# Patient Record
Sex: MALE | Race: ASIAN | Employment: OTHER
[De-identification: names, ages, dates, MRNs, and addresses within clinical notes are randomized per-mention and may not be internally consistent; named-entity substitution may affect disease eponyms.]

---

## 2017-04-24 ENCOUNTER — SURGERY (OUTPATIENT)
Age: 72
End: 2017-04-24

## 2017-04-24 ENCOUNTER — HOSPITAL ENCOUNTER (OUTPATIENT)
Facility: CLINIC | Age: 72
Discharge: HOME | End: 2017-04-24
Attending: COLON & RECTAL SURGERY | Admitting: COLON & RECTAL SURGERY
Payer: MEDICARE

## 2017-04-24 VITALS
DIASTOLIC BLOOD PRESSURE: 90 MMHG | HEIGHT: 62 IN | WEIGHT: 120 LBS | BODY MASS INDEX: 22.08 KG/M2 | SYSTOLIC BLOOD PRESSURE: 134 MMHG | RESPIRATION RATE: 17 BRPM | OXYGEN SATURATION: 97 %

## 2017-04-24 LAB — COLONOSCOPY: NORMAL

## 2017-04-24 PROCEDURE — 88305 TISSUE EXAM BY PATHOLOGIST: CPT | Performed by: COLON & RECTAL SURGERY

## 2017-04-24 PROCEDURE — 88342 IMHCHEM/IMCYTCHM 1ST ANTB: CPT | Performed by: COLON & RECTAL SURGERY

## 2017-04-24 PROCEDURE — 45380 COLONOSCOPY AND BIOPSY: CPT | Performed by: COLON & RECTAL SURGERY

## 2017-04-24 PROCEDURE — 88305 TISSUE EXAM BY PATHOLOGIST: CPT | Mod: 26 | Performed by: COLON & RECTAL SURGERY

## 2017-04-24 PROCEDURE — 88341 IMHCHEM/IMCYTCHM EA ADD ANTB: CPT | Mod: 26 | Performed by: COLON & RECTAL SURGERY

## 2017-04-24 PROCEDURE — G0500 MOD SEDAT ENDO SERVICE >5YRS: HCPCS | Performed by: COLON & RECTAL SURGERY

## 2017-04-24 PROCEDURE — 25000125 ZZHC RX 250: Performed by: COLON & RECTAL SURGERY

## 2017-04-24 PROCEDURE — 88341 IMHCHEM/IMCYTCHM EA ADD ANTB: CPT | Performed by: COLON & RECTAL SURGERY

## 2017-04-24 PROCEDURE — 25000128 H RX IP 250 OP 636: Performed by: COLON & RECTAL SURGERY

## 2017-04-24 PROCEDURE — 88342 IMHCHEM/IMCYTCHM 1ST ANTB: CPT | Mod: 26 | Performed by: COLON & RECTAL SURGERY

## 2017-04-24 RX ORDER — FENTANYL CITRATE 50 UG/ML
INJECTION, SOLUTION INTRAMUSCULAR; INTRAVENOUS PRN
Status: DISCONTINUED | OUTPATIENT
Start: 2017-04-24 | End: 2017-04-24 | Stop reason: HOSPADM

## 2017-04-24 RX ORDER — LIDOCAINE 40 MG/G
CREAM TOPICAL
Status: DISCONTINUED | OUTPATIENT
Start: 2017-04-24 | End: 2017-04-24 | Stop reason: HOSPADM

## 2017-04-24 RX ORDER — ONDANSETRON 2 MG/ML
4 INJECTION INTRAMUSCULAR; INTRAVENOUS
Status: DISCONTINUED | OUTPATIENT
Start: 2017-04-24 | End: 2017-04-24 | Stop reason: HOSPADM

## 2017-04-24 RX ADMIN — FENTANYL CITRATE 50 MCG: 50 INJECTION, SOLUTION INTRAMUSCULAR; INTRAVENOUS at 10:19

## 2017-04-24 RX ADMIN — FENTANYL CITRATE 50 MCG: 50 INJECTION, SOLUTION INTRAMUSCULAR; INTRAVENOUS at 10:26

## 2017-04-24 RX ADMIN — MIDAZOLAM HYDROCHLORIDE 1 MG: 1 INJECTION, SOLUTION INTRAMUSCULAR; INTRAVENOUS at 10:26

## 2017-04-24 RX ADMIN — MIDAZOLAM HYDROCHLORIDE 1 MG: 1 INJECTION, SOLUTION INTRAMUSCULAR; INTRAVENOUS at 10:19

## 2017-04-24 NOTE — H&P
Colon & Rectal Surgery History and Physical  Pre-Endoscopy Procedure Note    History of Present Illness   I have been asked by the physicians of the [facility] to evaluate this 71 year old male for colorectal cancer screening. S/he denies any abdominal pain, weight loss, bleeding per rectum, or recent change in bowel habits.    Past Medical History  Diagnosis Date     Heart disease    Pre-Diabetes  Essential Hypertension    Past Surgical History   No pertinent surgical history.     Medications  Medication Sig     amLODIPine (NORVASC) 5 MG tablet Take 1 tablet (5 mg) by mouth daily     lisinopril (PRINIVIL,ZESTRIL) 10 MG tablet Take 1 tablet (10 mg) by mouth daily       Allergies   No Known Allergies     Family History   Family history is not contributory     Social History   He reports that he has never smoked. He does not have any smokeless tobacco history on file. He reports that he does not drink alcohol or use illicit drugs.    Review of Systems   Constitutional:  No fever, weight change or fatigue.    Eyes:     No dry eyes or vision changes.   Ears/Nose/Throat/Neck:  No oral ulcers, sore throat or voice change.    Cardiovascular:   No palpitations, syncope, angina or edema.   Respiratory:    No chest pain, excessive sleepiness, shortness of breath or hemoptysis.    Gastrointestinal:   No abdominal pain, nausea, vomiting, diarrhea or heartburn.    Genitourinary:   No dysuria, hematuria, urinary retention or urinary frequency.   Musculoskeletal:  No joint swelling or arthralgias.    Dermatologic:  No skin rash or other skin changes.   Neurologic:    No focal weakness or numbness. No neuropathy.   Psychiatric:    No depression, anxiety, suicidal ideation, or paranoid ideation.   Endocrine:   No cold or heat intolerance, polydipsia, hirsutism, change in libido, or flushing.   Hematology/Lymphatic:  No bleeding or lymphadenopathy.    Allergy/Immunology:  No rhinitis or hives.     Physical Exam   Vitals:  BP  "115/79, RR 12, HR 62, height 1.575 m (5' 2\"), weight 54.4 kg (120 lb), SpO2 100 %.    General:  Alert and oriented to person, place and time   Airway: Normal oropharyngeal airway and neck mobility   Lungs:  Clear bilaterally   Heart:  Regular rate and rhythm   Abdomen: Soft, NT, ND, no masses   Rectal:  Perianal skin without excoriation, hemorrhoidal disease or anal fissure        Digital rectal examination reveals normal sphincter tone without masses    ASA Grade: II (mild systemic disease)    Impression: Cleared for use of conscious sedation for colorectal cancer screening    Plan: Proceed with colonoscopy     Estrella Epstein MD  [facility]  [phone]  "

## 2017-04-26 ENCOUNTER — TELEPHONE (OUTPATIENT)
Dept: FAMILY MEDICINE | Facility: CLINIC | Age: 72
End: 2017-04-26

## 2017-04-26 LAB — COPATH REPORT: NORMAL

## 2017-04-26 NOTE — TELEPHONE ENCOUNTER
Reason for Call:  Other appointment    Detailed comments: The patient was just diagnosed with Colon Cancer  And they would like for him to be seen sooner then 5/26 which is the first available opening    Phone Number Patient can be reached at: Wife Marie [phone]  Daughter Sugar [phone]    Best Time: anytime    Can we leave a detailed message on this number? YES    Call taken on 4/26/2017 at 12:14 PM by Ranjana Leblanc

## 2017-05-01 ENCOUNTER — HOSPITAL ENCOUNTER (OUTPATIENT)
Dept: CT IMAGING | Facility: CLINIC | Age: 72
Discharge: HOME | End: 2017-05-01
Attending: COLON & RECTAL SURGERY | Admitting: COLON & RECTAL SURGERY
Payer: MEDICARE

## 2017-05-01 DIAGNOSIS — C18.9 COLON CANCER (H): ICD-10-CM

## 2017-05-01 PROCEDURE — 25500064 ZZH RX 255 OP 636: Performed by: COLON & RECTAL SURGERY

## 2017-05-01 PROCEDURE — 25000125 ZZHC RX 250: Performed by: COLON & RECTAL SURGERY

## 2017-05-01 PROCEDURE — 71260 CT THORAX DX C+: CPT

## 2017-05-01 PROCEDURE — 74177 CT ABD & PELVIS W/CONTRAST: CPT

## 2017-05-01 RX ORDER — IOPAMIDOL 755 MG/ML
60 INJECTION, SOLUTION INTRAVASCULAR ONCE
Status: COMPLETED | OUTPATIENT
Start: 2017-05-01 | End: 2017-05-01

## 2017-05-01 RX ADMIN — IOPAMIDOL 60 ML: 755 INJECTION, SOLUTION INTRAVENOUS at 07:36

## 2017-05-01 RX ADMIN — SODIUM CHLORIDE 60 ML: 9 INJECTION, SOLUTION INTRAVENOUS at 07:36

## 2017-05-22 ENCOUNTER — TRANSFERRED RECORDS (OUTPATIENT)
Dept: HEALTH INFORMATION MANAGEMENT | Facility: CLINIC | Age: 72
End: 2017-05-22

## 2017-05-30 RX ORDER — AMLODIPINE AND BENAZEPRIL HYDROCHLORIDE 10; 20 MG/1; MG/1
1 CAPSULE ORAL DAILY
COMMUNITY
End: 2018-07-13

## 2017-05-31 ENCOUNTER — ANESTHESIA EVENT (OUTPATIENT)
Dept: SURGERY | Facility: CLINIC | Age: 72
DRG: 330 | End: 2017-05-31
Payer: MEDICARE

## 2017-06-01 ENCOUNTER — ANESTHESIA (OUTPATIENT)
Dept: SURGERY | Facility: CLINIC | Age: 72
DRG: 330 | End: 2017-06-01
Payer: MEDICARE

## 2017-06-01 ENCOUNTER — HOSPITAL ENCOUNTER (INPATIENT)
Facility: CLINIC | Age: 72
LOS: 4 days | Discharge: HOME | DRG: 330 | End: 2017-06-05
Attending: COLON & RECTAL SURGERY | Admitting: COLON & RECTAL SURGERY
Payer: MEDICARE

## 2017-06-01 DIAGNOSIS — C18.7 MALIGNANT NEOPLASM OF SIGMOID COLON (H): Primary | ICD-10-CM

## 2017-06-01 PROBLEM — C18.9 COLON CANCER (H): Status: ACTIVE | Noted: 2017-06-01

## 2017-06-01 LAB
CEA SERPL-MCNC: 3.2 UG/L (ref 0–2.5)
CREAT SERPL-MCNC: 0.87 MG/DL (ref 0.66–1.25)
GFR SERPL CREATININE-BSD FRML MDRD: 87 ML/MIN/1.7M2
GLUCOSE SERPL-MCNC: 113 MG/DL (ref 70–99)
PLATELET # BLD AUTO: 213 10E9/L (ref 150–450)
POTASSIUM SERPL-SCNC: 4.1 MMOL/L (ref 3.4–5.3)

## 2017-06-01 PROCEDURE — 25000125 ZZHC RX 250: Performed by: COLON & RECTAL SURGERY

## 2017-06-01 PROCEDURE — 25000125 ZZHC RX 250: Performed by: ANESTHESIOLOGY

## 2017-06-01 PROCEDURE — 37000009 ZZH ANESTHESIA TECHNICAL FEE, EACH ADDTL 15 MIN: Performed by: COLON & RECTAL SURGERY

## 2017-06-01 PROCEDURE — 27210794 ZZH OR GENERAL SUPPLY STERILE: Performed by: COLON & RECTAL SURGERY

## 2017-06-01 PROCEDURE — 0DTG4ZZ RESECTION OF LEFT LARGE INTESTINE, PERCUTANEOUS ENDOSCOPIC APPROACH: ICD-10-PCS | Performed by: COLON & RECTAL SURGERY

## 2017-06-01 PROCEDURE — 25000128 H RX IP 250 OP 636: Performed by: NURSE ANESTHETIST, CERTIFIED REGISTERED

## 2017-06-01 PROCEDURE — 37000008 ZZH ANESTHESIA TECHNICAL FEE, 1ST 30 MIN: Performed by: COLON & RECTAL SURGERY

## 2017-06-01 PROCEDURE — A9270 NON-COVERED ITEM OR SERVICE: HCPCS | Mod: GY | Performed by: COLON & RECTAL SURGERY

## 2017-06-01 PROCEDURE — 25800025 ZZH RX 258: Performed by: COLON & RECTAL SURGERY

## 2017-06-01 PROCEDURE — 27210995 ZZH RX 272: Performed by: COLON & RECTAL SURGERY

## 2017-06-01 PROCEDURE — 25000128 H RX IP 250 OP 636: Performed by: COLON & RECTAL SURGERY

## 2017-06-01 PROCEDURE — 71000013 ZZH RECOVERY PHASE 1 LEVEL 1 EA ADDTL HR: Performed by: COLON & RECTAL SURGERY

## 2017-06-01 PROCEDURE — 82378 CARCINOEMBRYONIC ANTIGEN: CPT | Performed by: COLON & RECTAL SURGERY

## 2017-06-01 PROCEDURE — 40000170 ZZH STATISTIC PRE-PROCEDURE ASSESSMENT II: Performed by: COLON & RECTAL SURGERY

## 2017-06-01 PROCEDURE — 36000067 ZZH SURGERY LEVEL 5 1ST 30 MIN: Performed by: COLON & RECTAL SURGERY

## 2017-06-01 PROCEDURE — 25000132 ZZH RX MED GY IP 250 OP 250 PS 637: Mod: GY | Performed by: COLON & RECTAL SURGERY

## 2017-06-01 PROCEDURE — 25000125 ZZHC RX 250: Performed by: NURSE ANESTHETIST, CERTIFIED REGISTERED

## 2017-06-01 PROCEDURE — 27211024 ZZHC OR SUPPLY OTHER OPNP: Performed by: COLON & RECTAL SURGERY

## 2017-06-01 PROCEDURE — 85049 AUTOMATED PLATELET COUNT: CPT | Performed by: COLON & RECTAL SURGERY

## 2017-06-01 PROCEDURE — 82947 ASSAY GLUCOSE BLOOD QUANT: CPT | Performed by: COLON & RECTAL SURGERY

## 2017-06-01 PROCEDURE — 88309 TISSUE EXAM BY PATHOLOGIST: CPT | Mod: 26 | Performed by: COLON & RECTAL SURGERY

## 2017-06-01 PROCEDURE — 36000069 ZZH SURGERY LEVEL 5 EA 15 ADDTL MIN: Performed by: COLON & RECTAL SURGERY

## 2017-06-01 PROCEDURE — 71000012 ZZH RECOVERY PHASE 1 LEVEL 1 FIRST HR: Performed by: COLON & RECTAL SURGERY

## 2017-06-01 PROCEDURE — 84132 ASSAY OF SERUM POTASSIUM: CPT | Performed by: COLON & RECTAL SURGERY

## 2017-06-01 PROCEDURE — 25000566 ZZH SEVOFLURANE, EA 15 MIN: Performed by: COLON & RECTAL SURGERY

## 2017-06-01 PROCEDURE — 25000128 H RX IP 250 OP 636: Performed by: ANESTHESIOLOGY

## 2017-06-01 PROCEDURE — 88309 TISSUE EXAM BY PATHOLOGIST: CPT | Performed by: COLON & RECTAL SURGERY

## 2017-06-01 PROCEDURE — 36415 COLL VENOUS BLD VENIPUNCTURE: CPT | Performed by: COLON & RECTAL SURGERY

## 2017-06-01 PROCEDURE — 12000007 ZZH R&B INTERMEDIATE

## 2017-06-01 PROCEDURE — 82565 ASSAY OF CREATININE: CPT | Performed by: COLON & RECTAL SURGERY

## 2017-06-01 PROCEDURE — 0DJD8ZZ INSPECTION OF LOWER INTESTINAL TRACT, VIA NATURAL OR ARTIFICIAL OPENING ENDOSCOPIC: ICD-10-PCS | Performed by: COLON & RECTAL SURGERY

## 2017-06-01 RX ORDER — FENTANYL CITRATE 50 UG/ML
INJECTION, SOLUTION INTRAMUSCULAR; INTRAVENOUS PRN
Status: DISCONTINUED | OUTPATIENT
Start: 2017-06-01 | End: 2017-06-01

## 2017-06-01 RX ORDER — PROPOFOL 10 MG/ML
INJECTION, EMULSION INTRAVENOUS PRN
Status: DISCONTINUED | OUTPATIENT
Start: 2017-06-01 | End: 2017-06-01

## 2017-06-01 RX ORDER — SODIUM CHLORIDE, SODIUM LACTATE, POTASSIUM CHLORIDE, CALCIUM CHLORIDE 600; 310; 30; 20 MG/100ML; MG/100ML; MG/100ML; MG/100ML
INJECTION, SOLUTION INTRAVENOUS CONTINUOUS
Status: DISCONTINUED | OUTPATIENT
Start: 2017-06-01 | End: 2017-06-01

## 2017-06-01 RX ORDER — DEXAMETHASONE SODIUM PHOSPHATE 4 MG/ML
INJECTION, SOLUTION INTRA-ARTICULAR; INTRALESIONAL; INTRAMUSCULAR; INTRAVENOUS; SOFT TISSUE PRN
Status: DISCONTINUED | OUTPATIENT
Start: 2017-06-01 | End: 2017-06-01

## 2017-06-01 RX ORDER — ALVIMOPAN 12 MG/1
12 CAPSULE ORAL ONCE
Status: COMPLETED | OUTPATIENT
Start: 2017-06-01 | End: 2017-06-01

## 2017-06-01 RX ORDER — ACETAMINOPHEN 10 MG/ML
1000 INJECTION, SOLUTION INTRAVENOUS ONCE
Status: COMPLETED | OUTPATIENT
Start: 2017-06-01 | End: 2017-06-01

## 2017-06-01 RX ORDER — ONDANSETRON 4 MG/1
4 TABLET, ORALLY DISINTEGRATING ORAL EVERY 30 MIN PRN
Status: DISCONTINUED | OUTPATIENT
Start: 2017-06-01 | End: 2017-06-01 | Stop reason: HOSPADM

## 2017-06-01 RX ORDER — NALOXONE HYDROCHLORIDE 0.4 MG/ML
.1-.4 INJECTION, SOLUTION INTRAMUSCULAR; INTRAVENOUS; SUBCUTANEOUS
Status: DISCONTINUED | OUTPATIENT
Start: 2017-06-01 | End: 2017-06-01

## 2017-06-01 RX ORDER — ALBUTEROL SULFATE 0.83 MG/ML
2.5 SOLUTION RESPIRATORY (INHALATION) EVERY 4 HOURS PRN
Status: DISCONTINUED | OUTPATIENT
Start: 2017-06-01 | End: 2017-06-01 | Stop reason: HOSPADM

## 2017-06-01 RX ORDER — DIPHENHYDRAMINE HYDROCHLORIDE 50 MG/ML
12.5 INJECTION INTRAMUSCULAR; INTRAVENOUS EVERY 6 HOURS PRN
Status: DISCONTINUED | OUTPATIENT
Start: 2017-06-01 | End: 2017-06-05 | Stop reason: HOSPADM

## 2017-06-01 RX ORDER — LISINOPRIL 10 MG/1
20 TABLET ORAL DAILY
Status: DISCONTINUED | OUTPATIENT
Start: 2017-06-02 | End: 2017-06-05 | Stop reason: HOSPADM

## 2017-06-01 RX ORDER — CIPROFLOXACIN 2 MG/ML
400 INJECTION, SOLUTION INTRAVENOUS
Status: DISCONTINUED | OUTPATIENT
Start: 2017-06-01 | End: 2017-06-01

## 2017-06-01 RX ORDER — NEOSTIGMINE METHYLSULFATE 1 MG/ML
VIAL (ML) INJECTION PRN
Status: DISCONTINUED | OUTPATIENT
Start: 2017-06-01 | End: 2017-06-01

## 2017-06-01 RX ORDER — NALOXONE HYDROCHLORIDE 0.4 MG/ML
.1-.4 INJECTION, SOLUTION INTRAMUSCULAR; INTRAVENOUS; SUBCUTANEOUS
Status: DISCONTINUED | OUTPATIENT
Start: 2017-06-01 | End: 2017-06-05 | Stop reason: HOSPADM

## 2017-06-01 RX ORDER — ONDANSETRON 2 MG/ML
4 INJECTION INTRAMUSCULAR; INTRAVENOUS EVERY 30 MIN PRN
Status: DISCONTINUED | OUTPATIENT
Start: 2017-06-01 | End: 2017-06-01 | Stop reason: HOSPADM

## 2017-06-01 RX ORDER — GLYCOPYRROLATE 0.2 MG/ML
INJECTION, SOLUTION INTRAMUSCULAR; INTRAVENOUS PRN
Status: DISCONTINUED | OUTPATIENT
Start: 2017-06-01 | End: 2017-06-01

## 2017-06-01 RX ORDER — CIPROFLOXACIN 2 MG/ML
400 INJECTION, SOLUTION INTRAVENOUS EVERY 12 HOURS
Status: COMPLETED | OUTPATIENT
Start: 2017-06-01 | End: 2017-06-02

## 2017-06-01 RX ORDER — SODIUM CHLORIDE, SODIUM LACTATE, POTASSIUM CHLORIDE, CALCIUM CHLORIDE 600; 310; 30; 20 MG/100ML; MG/100ML; MG/100ML; MG/100ML
INJECTION, SOLUTION INTRAVENOUS CONTINUOUS
Status: DISCONTINUED | OUTPATIENT
Start: 2017-06-01 | End: 2017-06-01 | Stop reason: HOSPADM

## 2017-06-01 RX ORDER — ONDANSETRON 2 MG/ML
4 INJECTION INTRAMUSCULAR; INTRAVENOUS EVERY 6 HOURS PRN
Status: DISCONTINUED | OUTPATIENT
Start: 2017-06-01 | End: 2017-06-05 | Stop reason: HOSPADM

## 2017-06-01 RX ORDER — SODIUM CHLORIDE, SODIUM LACTATE, POTASSIUM CHLORIDE, CALCIUM CHLORIDE 600; 310; 30; 20 MG/100ML; MG/100ML; MG/100ML; MG/100ML
INJECTION, SOLUTION INTRAVENOUS CONTINUOUS
Status: DISCONTINUED | OUTPATIENT
Start: 2017-06-01 | End: 2017-06-05 | Stop reason: HOSPADM

## 2017-06-01 RX ORDER — MAGNESIUM HYDROXIDE 1200 MG/15ML
LIQUID ORAL PRN
Status: DISCONTINUED | OUTPATIENT
Start: 2017-06-01 | End: 2017-06-01 | Stop reason: HOSPADM

## 2017-06-01 RX ORDER — FENTANYL CITRATE 0.05 MG/ML
25-50 INJECTION, SOLUTION INTRAMUSCULAR; INTRAVENOUS
Status: DISCONTINUED | OUTPATIENT
Start: 2017-06-01 | End: 2017-06-01 | Stop reason: HOSPADM

## 2017-06-01 RX ORDER — ALVIMOPAN 12 MG/1
12 CAPSULE ORAL 2 TIMES DAILY
Status: DISCONTINUED | OUTPATIENT
Start: 2017-06-02 | End: 2017-06-04

## 2017-06-01 RX ORDER — CIPROFLOXACIN 2 MG/ML
400 INJECTION, SOLUTION INTRAVENOUS SEE ADMIN INSTRUCTIONS
Status: DISCONTINUED | OUTPATIENT
Start: 2017-06-01 | End: 2017-06-01

## 2017-06-01 RX ORDER — MEPERIDINE HYDROCHLORIDE 25 MG/ML
12.5 INJECTION INTRAMUSCULAR; INTRAVENOUS; SUBCUTANEOUS EVERY 5 MIN PRN
Status: DISCONTINUED | OUTPATIENT
Start: 2017-06-01 | End: 2017-06-01 | Stop reason: HOSPADM

## 2017-06-01 RX ORDER — ONDANSETRON 2 MG/ML
INJECTION INTRAMUSCULAR; INTRAVENOUS PRN
Status: DISCONTINUED | OUTPATIENT
Start: 2017-06-01 | End: 2017-06-01

## 2017-06-01 RX ORDER — LIDOCAINE 40 MG/G
CREAM TOPICAL
Status: DISCONTINUED | OUTPATIENT
Start: 2017-06-01 | End: 2017-06-05 | Stop reason: HOSPADM

## 2017-06-01 RX ORDER — HEPARIN SODIUM 5000 [USP'U]/.5ML
5000 INJECTION, SOLUTION INTRAVENOUS; SUBCUTANEOUS
Status: COMPLETED | OUTPATIENT
Start: 2017-06-01 | End: 2017-06-01

## 2017-06-01 RX ORDER — LIDOCAINE HYDROCHLORIDE 20 MG/ML
INJECTION, SOLUTION INFILTRATION; PERINEURAL PRN
Status: DISCONTINUED | OUTPATIENT
Start: 2017-06-01 | End: 2017-06-01

## 2017-06-01 RX ORDER — ACETAMINOPHEN 325 MG/1
975 TABLET ORAL ONCE
Status: COMPLETED | OUTPATIENT
Start: 2017-06-01 | End: 2017-06-01

## 2017-06-01 RX ORDER — AMLODIPINE BESYLATE 10 MG/1
10 TABLET ORAL DAILY
Status: DISCONTINUED | OUTPATIENT
Start: 2017-06-02 | End: 2017-06-05 | Stop reason: HOSPADM

## 2017-06-01 RX ORDER — AMLODIPINE AND BENAZEPRIL HYDROCHLORIDE 10; 20 MG/1; MG/1
1 CAPSULE ORAL DAILY
Status: DISCONTINUED | OUTPATIENT
Start: 2017-06-01 | End: 2017-06-01 | Stop reason: CLARIF

## 2017-06-01 RX ORDER — EPHEDRINE SULFATE 50 MG/ML
INJECTION, SOLUTION INTRAMUSCULAR; INTRAVENOUS; SUBCUTANEOUS PRN
Status: DISCONTINUED | OUTPATIENT
Start: 2017-06-01 | End: 2017-06-01

## 2017-06-01 RX ADMIN — PHENYLEPHRINE HYDROCHLORIDE 100 MCG: 10 INJECTION, SOLUTION INTRAMUSCULAR; INTRAVENOUS; SUBCUTANEOUS at 11:10

## 2017-06-01 RX ADMIN — SODIUM CHLORIDE, POTASSIUM CHLORIDE, SODIUM LACTATE AND CALCIUM CHLORIDE: 600; 310; 30; 20 INJECTION, SOLUTION INTRAVENOUS at 09:17

## 2017-06-01 RX ADMIN — GLYCOPYRROLATE 0.5 MG: 0.2 INJECTION, SOLUTION INTRAMUSCULAR; INTRAVENOUS at 13:13

## 2017-06-01 RX ADMIN — SODIUM CHLORIDE, POTASSIUM CHLORIDE, SODIUM LACTATE AND CALCIUM CHLORIDE: 600; 310; 30; 20 INJECTION, SOLUTION INTRAVENOUS at 17:37

## 2017-06-01 RX ADMIN — LIDOCAINE HYDROCHLORIDE 1 ML: 10 INJECTION, SOLUTION EPIDURAL; INFILTRATION; INTRACAUDAL; PERINEURAL at 09:26

## 2017-06-01 RX ADMIN — DEXAMETHASONE SODIUM PHOSPHATE 4 MG: 4 INJECTION, SOLUTION INTRA-ARTICULAR; INTRALESIONAL; INTRAMUSCULAR; INTRAVENOUS; SOFT TISSUE at 10:28

## 2017-06-01 RX ADMIN — DEXMEDETOMIDINE HYDROCHLORIDE 4 MCG: 100 INJECTION, SOLUTION INTRAVENOUS at 12:50

## 2017-06-01 RX ADMIN — ROCURONIUM BROMIDE 10 MG: 10 INJECTION INTRAVENOUS at 11:14

## 2017-06-01 RX ADMIN — FENTANYL CITRATE 50 MCG: 50 INJECTION, SOLUTION INTRAMUSCULAR; INTRAVENOUS at 10:32

## 2017-06-01 RX ADMIN — PHENYLEPHRINE HYDROCHLORIDE 50 MCG: 10 INJECTION, SOLUTION INTRAMUSCULAR; INTRAVENOUS; SUBCUTANEOUS at 10:30

## 2017-06-01 RX ADMIN — FENTANYL CITRATE 25 MCG: 50 INJECTION, SOLUTION INTRAMUSCULAR; INTRAVENOUS at 10:06

## 2017-06-01 RX ADMIN — HEPARIN SODIUM 5000 UNITS: 10000 INJECTION, SOLUTION INTRAVENOUS; SUBCUTANEOUS at 10:15

## 2017-06-01 RX ADMIN — FENTANYL CITRATE 25 MCG: 50 INJECTION, SOLUTION INTRAMUSCULAR; INTRAVENOUS at 10:52

## 2017-06-01 RX ADMIN — ROCURONIUM BROMIDE 30 MG: 10 INJECTION INTRAVENOUS at 10:06

## 2017-06-01 RX ADMIN — ALVIMOPAN 12 MG: 12 CAPSULE ORAL at 09:14

## 2017-06-01 RX ADMIN — CIPROFLOXACIN 400 MG: 2 INJECTION, SOLUTION INTRAVENOUS at 22:13

## 2017-06-01 RX ADMIN — MIDAZOLAM HYDROCHLORIDE 0.5 MG: 1 INJECTION, SOLUTION INTRAMUSCULAR; INTRAVENOUS at 10:00

## 2017-06-01 RX ADMIN — FENTANYL CITRATE 25 MCG: 50 INJECTION, SOLUTION INTRAMUSCULAR; INTRAVENOUS at 13:17

## 2017-06-01 RX ADMIN — ONDANSETRON 4 MG: 2 INJECTION INTRAMUSCULAR; INTRAVENOUS at 12:42

## 2017-06-01 RX ADMIN — NEOSTIGMINE METHYLSULFATE 3.5 MG: 1 INJECTION INTRAMUSCULAR; INTRAVENOUS; SUBCUTANEOUS at 13:13

## 2017-06-01 RX ADMIN — SODIUM CHLORIDE, POTASSIUM CHLORIDE, SODIUM LACTATE AND CALCIUM CHLORIDE: 600; 310; 30; 20 INJECTION, SOLUTION INTRAVENOUS at 11:17

## 2017-06-01 RX ADMIN — HYDROMORPHONE HYDROCHLORIDE: 10 INJECTION, SOLUTION INTRAMUSCULAR; INTRAVENOUS; SUBCUTANEOUS at 16:20

## 2017-06-01 RX ADMIN — CIPROFLOXACIN 400 MG: 2 INJECTION INTRAVENOUS at 10:24

## 2017-06-01 RX ADMIN — FENTANYL CITRATE 25 MCG: 50 INJECTION, SOLUTION INTRAMUSCULAR; INTRAVENOUS at 11:32

## 2017-06-01 RX ADMIN — ROCURONIUM BROMIDE 5 MG: 10 INJECTION INTRAVENOUS at 12:25

## 2017-06-01 RX ADMIN — Medication 10 MG: at 10:42

## 2017-06-01 RX ADMIN — ACETAMINOPHEN 1000 MG: 10 INJECTION, SOLUTION INTRAVENOUS at 17:37

## 2017-06-01 RX ADMIN — METRONIDAZOLE 500 MG: 500 INJECTION, SOLUTION INTRAVENOUS at 17:41

## 2017-06-01 RX ADMIN — DEXMEDETOMIDINE HYDROCHLORIDE 8 MCG: 100 INJECTION, SOLUTION INTRAVENOUS at 13:17

## 2017-06-01 RX ADMIN — DEXMEDETOMIDINE HYDROCHLORIDE 8 MCG: 100 INJECTION, SOLUTION INTRAVENOUS at 10:56

## 2017-06-01 RX ADMIN — LIDOCAINE HYDROCHLORIDE 40 MG: 20 INJECTION, SOLUTION INFILTRATION; PERINEURAL at 10:06

## 2017-06-01 RX ADMIN — METRONIDAZOLE 500 MG: 500 INJECTION, SOLUTION INTRAVENOUS at 10:20

## 2017-06-01 RX ADMIN — PHENYLEPHRINE HYDROCHLORIDE 100 MCG: 10 INJECTION, SOLUTION INTRAMUSCULAR; INTRAVENOUS; SUBCUTANEOUS at 11:19

## 2017-06-01 RX ADMIN — ACETAMINOPHEN 975 MG: 325 TABLET, FILM COATED ORAL at 09:14

## 2017-06-01 RX ADMIN — DEXMEDETOMIDINE HYDROCHLORIDE 8 MCG: 100 INJECTION, SOLUTION INTRAVENOUS at 11:32

## 2017-06-01 RX ADMIN — PHENYLEPHRINE HYDROCHLORIDE 100 MCG: 10 INJECTION, SOLUTION INTRAMUSCULAR; INTRAVENOUS; SUBCUTANEOUS at 10:40

## 2017-06-01 RX ADMIN — PROPOFOL 120 MG: 10 INJECTION, EMULSION INTRAVENOUS at 10:06

## 2017-06-01 NOTE — IP AVS SNAPSHOT
MRN:[id number]                      After Visit Summary   6/1/2017    Sallie Ceballos    MRN: [id number]           Thank you!     Thank you for choosing [facility] for your care. Our goal is always to provide you with excellent care. Hearing back from our patients is one way we can continue to improve our services. Please take a few minutes to complete the written survey that you may receive in the mail after you visit with us. Thank you!        Patient Information     Date Of Birth          1945        Designated Caregiver       Most Recent Value    Caregiver    Will someone help with your care after discharge? yes    Name of designated caregiver Marie    Phone number of caregiver [phone]    Caregiver address [address]      About your hospital stay     You were admitted on:  June 1, 2017 You last received care in the:  [place] [address] [facility]    You were discharged on:  June 5, 2017        Reason for your hospital stay       The patient was in the hospital to have surgery for colon cancer                  Who to Call     For medical emergencies, please call 911.  For non-urgent questions about your medical care, please call your primary care provider or clinic, None  For questions related to your surgery, please call your surgery clinic        Attending Provider     Provider Specialty    Darwin Lowry MD Colon and Rectal Surgery       Primary Care Provider    None      After Care Instructions     Activity       Your activity upon discharge: No heavy lifting or straining over 15-20 lbs for 6 weeks. No Driving while taking narcotic pain medications            Diet       Follow this diet upon discharge: Continue a low residue diet for 6 weeks.                  Follow-up Appointments     Follow-up and recommended labs and tests        Follow up in the office in 3-4 weeks with Dr. Lowry or at your already scheduled post op visit. Call [phone] to  schedule your appointment. Call the office at [phone] if you develop fever, uncontrolled pain, bleeding, nausea, vomiting, or constipation.                  Further instructions from your care team           Discharge Instructions following Abdominal Surgery  [facility]      Bowel Function  After removal of a portion of the intestines, it is normal for bowel movements to be erratic.  They are often looser and more frequent.  This condition generally resolves within a few weeks or it can be controlled with diet or medication.  Diet  In general, you may return to a well-balanced diet upon discharge.  Your doctor will let you know when to add extra fiber to your diet.  Be sure to drink plenty of fluids.  Incision  You should expect some discomfort in the area of your incision, particularly as you increase your activity.  If you notice an area of increasing redness or new drainage, please call your doctor.  You may shower as desired but refrain from tub baths or swimming until the incisions are fully healed.  Activity  Gradually increase your activity each day.  There are generally no restrictions on walking, climbing stairs, or riding in a car.  You can usually drive a car 7-10 days after your leave the hospital.  Do not drive while taking narcotic pain medications.  Ask your doctor regarding resumption of physical sexual activity; in most cases, you can resume sex after a few weeks.  Your physician will advise you about when to return to work.  It is preferable to have somebody stay with you at home until you are fully healed and able to resume a normal level of activity   Medications  You will be discharged with a prescription pain medication and any medications you were taking prior to surgery.  Do not drive while taking narcotic pain medications.  If you need additional medications or a refill, you must call your doctor during normal business hours.  It is the policy of  "[facility] to not refill pain medication after hours or on weekends because your chart is not available.  Follow-up  Typically, you will be asked to schedule a follow-up office visit 1 to 4 weeks after surgery.  If you have any questions related to follow-up, medications, or your condition, please call the office.      Call your surgeon if you have these signs or symptoms:  ([phone])    Problem with the incision, including increasing pain, swelling, redness, or drainage    Increasing abdominal pain    Uncontrolled nausea or vomiting    Fever or chills    Constipation  (no bowel movement for 3 days)    Diarrhea (more than 3 watery stools within 24 hours)    Bleeding from the rectum, wound, or stoma    Any questions or concerns      Pending Results     Date and Time Order Name Status Description    6/1/2017 1230 Surgical pathology exam In process             Statement of Approval     Ordered          06/05/17 0810  I have reviewed and agree with all the recommendations and orders detailed in this document.  EFFECTIVE NOW     Approved and electronically signed by:  Lluvia Fernandes PA-C             Admission Information     Date & Time Provider Department Dept. Phone    6/1/2017 Darwin Lowry MD [place] [address] [facility] [phone]      Your Vitals Were     Blood Pressure Pulse Temperature Respirations Height Weight    128/94 (BP Location: Left arm) 78 97.4  F (36.3  C) (Oral) 16 1.575 m (5' 2\") 51.7 kg (114 lb)    Pulse Oximetry BMI (Body Mass Index)                96% 20.85 kg/m2          MyChart Information     The Smartphone Physical lets you send messages to your doctor, view your test results, renew your prescriptions, schedule appointments and more. To sign up, go to [email or web address] . Click on \"Log in\" on the left side of the screen, which will take you to the Welcome page. Then click on \"Sign up Now\" on the right side of the page.     You will be asked to " enter the access code listed below, as well as some personal information. Please follow the directions to create your username and password.     Your access code is: [email or web address]  Expires: 9/3/2017 10:12 AM     Your access code will  in 90 days. If you need help or a new code, please call your [facility] clinic or [phone].        Care EveryWhere ID     This is your Care EveryWhere ID. This could be used by other organizations to access your [facility] medical records  [id number]           Review of your medicines      START taking        Dose / Directions    enoxaparin 40 MG/0.4ML injection   Commonly known as:  LOVENOX        Dose:  40 mg   Inject 0.4 mLs (40 mg) Subcutaneous every 24 hours   Quantity:  26 Syringe   Refills:  0       oxyCODONE-acetaminophen 5-325 MG per tablet   Commonly known as:  PERCOCET        Dose:  1 tablet   Take 1 tablet by mouth every 4 hours as needed for moderate to severe pain   Quantity:  30 tablet   Refills:  0         CONTINUE these medicines which have NOT CHANGED        Dose / Directions    amLODIPine-benazepril 10-20 MG per capsule   Commonly known as:  LOTREL        Dose:  1 capsule   Take 1 capsule by mouth daily   Refills:  0            Where to get your medicines      These medications were sent to [facility] - [address]  [address] [place] [address]     Phone:  [phone]     enoxaparin 40 MG/0.4ML injection         Some of these will need a paper prescription and others can be bought over the counter. Ask your nurse if you have questions.     Bring a paper prescription for each of these medications     oxyCODONE-acetaminophen 5-325 MG per tablet                Protect others around you: Learn how to safely use, store and throw away your medicines at www.disposemymeds.org.             Medication List: This is a list of all your medications and when to take them. Check marks below indicate your daily home schedule.  Keep this list as a reference.      Medications           Morning Afternoon Evening Bedtime As Needed    amLODIPine-benazepril 10-20 MG per capsule   Commonly known as:  LOTREL   Take 1 capsule by mouth daily                                   enoxaparin 40 MG/0.4ML injection   Commonly known as:  LOVENOX   Inject 0.4 mLs (40 mg) Subcutaneous every 24 hours   Last time this was given:  30 mg on 6/5/2017  8:50 AM   Next Dose Due:  6/6/17 9 am                                    oxyCODONE-acetaminophen 5-325 MG per tablet   Commonly known as:  PERCOCET   Take 1 tablet by mouth every 4 hours as needed for moderate to severe pain   Last time this was given:  1 tablet on 6/3/2017 11:32 PM

## 2017-06-01 NOTE — PROGRESS NOTES
Admission medication history interview status for the 6/1/2017  admission is complete. See EPIC admission navigator for prior to admission medications     Medication history source reliability:Good    Medication history interview source(s):Patient    Medication history resources (including written lists, pill bottles, clinic record):None    Primary pharmacy.[facility]    Additional medication history information not noted on PTA med list :None    Time spent in this activity: 45 minutes    Prior to Admission medications    Medication Sig Last Dose Taking? Auth Provider   amLODIPine-benazepril (LOTREL) 10-20 MG per capsule Take 1 capsule by mouth daily 6/1/2017 at 0500 Yes Reported, Patient

## 2017-06-01 NOTE — ANESTHESIA PREPROCEDURE EVALUATION
Anesthesia Evaluation     . Pt has not had prior anesthetic            ROS/MED HX    ENT/Pulmonary:      (-) asthma and sleep apnea   Neurologic: Comment: BPPV      Cardiovascular: Comment: Study Date: 09/20/2016 10:24 AM  Age: 70 yrs  Gender: Male  Patient [facility]  Reason For Study: Hypertensive Heart Disease  Ordering Physician: LOUISE COUGHLIN  Referring Physician: none  Performed By: Felicita Young RDCS     BSA: 1.6 m2  Height: 62 in  Weight: 135 lb  HR: 73  BP: 118/82 mmHg  ______________________________________________________________________________        Procedure  Complete Portable Echo Adult. Contrast Lumason.  ______________________________________________________________________________     Interpretation Summary     Left ventricular systolic function is normal.  No regional wall motion abnormalities noted.  The ascending aorta is Mildly dilated.  ______________________________________________________________________________           Left Ventricle  The left ventricle is normal in size. There is mild concentric left  ventricular hypertrophy. Left ventricular systolic function is normal. The  visual ejection fraction is estimated at 60-65%. Grade I or early diastolic  dysfunction. E by E prime ratio is greater than 15, that likely suggests  increased left ventricular filling pressures. No regional wall motion  abnormalities noted.     Right Ventricle  The right ventricle is borderline dilated. The right ventricle is not well  visualized.  Atria  Normal left atrial size. Right atrial size is normal. There is no atrial  shunt seen.     Mitral Valve  There is mild to moderate mitral annular calcification. There is trace mitral  regurgitation.     Tricuspid Valve  The tricuspid valve is not well visualized, but is grossly normal. There is  mild to moderate (1-2+) tricuspid regurgitation. The right ventricular  systolic pressure is approximated at 21.1 mmHg plus the right atrial  pressure. Normal IVC  (1.5-2.5cm) with >50% respiratory collapse; right atrial  pressure is estimated at 5-10mmHg.     Aortic Valve  The aortic valve is trileaflet. No aortic regurgitation is present. No  hemodynamically significant valvular aortic stenosis.     Pulmonic Valve  The pulmonic valve is not well visualized. There is mild (1+) pulmonic  valvular regurgitation. Normal pulmonic valve velocity.     Vessels  The aortic root is normal size. The ascending aorta is Mildly dilated.  Inferior vena cava not well visualized for estimation of right atrial  pressure. The IVC is normal in size and reactivity with respiration,  suggesting normal central venous pressure.  Pericardium  There is no pericardial effusion.     Rhythm  The rhythm was normal sinus.    (+) Dyslipidemia, hypertension----. : . . . :. .      (-) HARTMAN   METS/Exercise Tolerance:  >4 METS   Hematologic:         Musculoskeletal:         GI/Hepatic:        (-) GERD   Renal/Genitourinary:         Endo: Comment: Pre DM        Psychiatric:         Infectious Disease:         Malignancy:   (+) Malignancy History of GI  GI CA Active status post,         Other:                     Physical Exam      Airway   Mallampati: II  TM distance: >3 FB  Neck ROM: full    Dental   (+) loose, missing and chipped    Cardiovascular   Rhythm and rate: regular      Pulmonary    breath sounds clear to auscultation                    Anesthesia Plan      History & Physical Review  History and physical reviewed and following examination; no interval change.    ASA Status:  3 .        Plan for General and ETT   PONV prophylaxis:  Dexamethasone or Solumedrol and Ondansetron (or other 5HT-3)  Additional equipment: Videolaryngoscope glidescope       Postoperative Care      Consents  Anesthetic plan, risks, benefits and alternatives discussed with:  Patient..                          .  Procedure: Procedure(s):  LAPAROSCOPIC ASSISTED SIGMOID COLECTOMY  Preop diagnosis: COLON CANCER    No Known  Allergies  Past Medical History:   Diagnosis Date     BPPV (benign paroxysmal positional vertigo)      Heart disease      HLD (hyperlipidemia)      Hypertension      Prediabetes      Past Surgical History:   Procedure Laterality Date     COLONOSCOPY N/A 4/24/2017    Procedure: COMBINED COLONOSCOPY, SINGLE OR MULTIPLE BIOPSY/POLYPECTOMY BY BIOPSY;  colonoscopy;  Surgeon: Estrella Epstein MD;  [facility]: [facility]     Prior to Admission medications    Medication Sig Start Date End Date Taking? Authorizing Provider   amLODIPine-benazepril (LOTREL) 10-20 MG per capsule Take 1 capsule by mouth daily   Yes Reported, Patient     Current Facility-Administered Medications Ordered in Epic   Medication Dose Route Frequency Last Rate Last Dose     acetaminophen (TYLENOL) tablet 975 mg  975 mg Oral Once         heparin sodium PF injection 5,000 Units  5,000 Units Subcutaneous Pre-Op/Pre-procedure x 1 dose         ciprofloxacin (CIPRO) infusion 400 mg  400 mg Intravenous Pre-Op/Pre-procedure x 1 dose         ciprofloxacin (CIPRO) infusion 400 mg  400 mg Intravenous See Admin Instructions         metroNIDAZOLE (FLAGYL) infusion 500 mg  500 mg Intravenous Pre-Op/Pre-procedure x 1 dose         metroNIDAZOLE (FLAGYL) infusion 500 mg  500 mg Intravenous See Admin Instructions         alvimopan (ENTEREG) capsule 12 mg  12 mg Oral Once         lidocaine 1 % 1 mL  1 mL Other Q1H PRN         sodium chloride (PF) 0.9% PF flush 3 mL  3 mL Intracatheter Q1H PRN         lactated ringers infusion   Intravenous Continuous         No current [facility]-ordered outpatient prescriptions on file.     Wt Readings from Last 1 Encounters:   04/24/17 54.4 kg (120 lb)     Temp Readings from Last 1 Encounters:   09/20/16 37.1  C (98.7  F) (Oral)     BP Readings from Last 6 Encounters:   04/24/17 134/90   09/20/16 118/82     Pulse Readings from Last 4 Encounters:   09/19/16 84     Resp Readings from Last 1 Encounters:   04/24/17 17     SpO2 Readings from Last 1  Encounters:   04/24/17 97%     Recent Labs   Lab Test  09/19/16   2305  09/19/16   1255   NA  138  136   POTASSIUM  4.0  3.7   CHLORIDE  105  101   CO2  26  27   ANIONGAP  7  8   GLC  118*  149*   BUN  12  16   CR  0.94  1.00   ALBER  8.2*  8.4*     Recent Labs   Lab Test  09/19/16   1255   WBC  9.5   HGB  16.0   PLT  197     No results for input(s): INR in the last 92336 hours.    Invalid input(s): APTT   RECENT LABS:   ECG:   ECHO:   CXR:

## 2017-06-01 NOTE — IP AVS SNAPSHOT
[place] [address] [facility]    [address] [place] [address]    Phone:  [phone]                                       After Visit Summary   6/1/2017    Sallie Ceballos    MRN: [id number]           After Visit Summary Signature Page     I have received my discharge instructions, and my questions have been answered. I have discussed any challenges I see with this plan with the nurse or doctor.    ..........................................................................................................................................  Patient/Patient Representative Signature      ..........................................................................................................................................  Patient Representative Print Name and Relationship to Patient    ..................................................               ................................................  Date                                            Time    ..........................................................................................................................................  Reviewed by Signature/Title    ...................................................              ..............................................  Date                                                            Time

## 2017-06-01 NOTE — OR NURSING
Colonoscopy done intra-op for tattoo/submucosal injection. 3cc Ink injected into tumor site. No specimens taken.

## 2017-06-01 NOTE — BRIEF OP NOTE
[facility] Brief Operative Note    Pre-operative diagnosis: COLON CANCER   Post-operative diagnosis * No post-op diagnosis entered *     Procedure: Procedure(s):  INTRAOP COLONOSCOPY WITH TATTOOING/ LAPAROSCOPIC ASSISTED LEFT COLECTOMY  - Wound Class: II-Clean Contaminated  Tattoo submucosal injection  - Wound Class: II-Clean Contaminated   Surgeon(s): Surgeon(s) and Role:     * Darwin Lowry MD - Primary   Estimated blood loss: 25 mL    Specimens:   ID Type Source Tests Collected by Time Destination   A : SPLENIC FLEXURE RESECTION, STAPLE LINE= DISTAL Tissue Colon SURGICAL PATHOLOGY EXAM Darwin Lowry MD 6/1/2017 12:30 PM       Findings: Tethered omentum LUQ. Unable to visualize mass well. Colonoscopy performed. Lesion tattooed. Confirmed at splenic flexure. Handsewn anastomosis.    Condition on discharge from OR: Satisfactory    Debra Veronica MD   [facility]   [phone].        ADDENDUM:    PATIENT DATA  Indicate Y or N:  Home O2 No  Hemodialysis  No  Transplant patient  No  Cirrhosis  No  Steroids in last 30 days  No  Immunomodulators in last 30 days  No  Anticoagulation at time of surgery  No   List medication   Prior abdominal surgery  No  Pelvic irradiation  No    Albumin within 30 days if known 4.2   Hgb within 30 days if known 13.7   Hemoglobin   Date Value Ref Range Status   09/19/2016 16.0 13.3 - 17.7 g/dL Final   ]  Cr within 30 days if known 0.96   Creatinine   Date Value Ref Range Status   09/19/2016 0.94 0.66 - 1.25 mg/dL Final   ]  Body mass index is 20.85 kg/(m^2).      OR DATA  Emergent  No   <24 hours  No   <1 week  No  Bowel Prep Yes  Antibiotics  Yes  DVT prophylaxis    Heparin  Yes   SCD  YES   None  No  Drain  No  ASA 3    OR time (min) 169  Stents  No  Transfuse >/= 2U  No  Anastomosis   Stapled  No   Handsewn  Yes  Leak Test    Positive  No   Negative  No   Not done  Yes    FOR CANCER ALSO COMPLETE:  Preoperative treatment (Y or N)   Chemo   No   Radiation No   Diversion  No    CEA unknown  Metastatic disease at time of operation  No       Route (Have a good day)

## 2017-06-01 NOTE — ANESTHESIA CARE TRANSFER NOTE
Patient: Sallie Ceballos    Procedure(s):  INTRAOP COLONOSCOPY WITH TATTOOING/ LAPAROSCOPIC ASSISTED LEFT COLECTOMY  - Wound Class: II-Clean Contaminated  Tattoo submucosal injection  - Wound Class: II-Clean Contaminated    Diagnosis: COLON CANCER  Diagnosis Additional Information: No value filed.    Anesthesia Type:   General, ETT     Note:  Airway :Face Mask  Patient transferred to:PACU  Comments: Patient spontaneously breathing and following commands. VSS. Report given to RN.      Vitals: (Last set prior to Anesthesia Care Transfer)    CRNA VITALS  6/1/2017 1306 - 6/1/2017 1342      6/1/2017             NIBP: 97/68    NIBP Mean: 76                Electronically Signed By: DAVID Jeter CRNA  June 1, 2017  1:42 PM

## 2017-06-02 ENCOUNTER — APPOINTMENT (OUTPATIENT)
Dept: PHYSICAL THERAPY | Facility: CLINIC | Age: 72
DRG: 330 | End: 2017-06-02
Attending: PHYSICIAN ASSISTANT
Payer: MEDICARE

## 2017-06-02 LAB
ANION GAP SERPL CALCULATED.3IONS-SCNC: 9 MMOL/L (ref 3–14)
BUN SERPL-MCNC: 11 MG/DL (ref 7–30)
CALCIUM SERPL-MCNC: 8.4 MG/DL (ref 8.5–10.1)
CHLORIDE SERPL-SCNC: 107 MMOL/L (ref 94–109)
CO2 SERPL-SCNC: 24 MMOL/L (ref 20–32)
CREAT SERPL-MCNC: 0.84 MG/DL (ref 0.66–1.25)
ERYTHROCYTE [DISTWIDTH] IN BLOOD BY AUTOMATED COUNT: 13.6 % (ref 10–15)
GFR SERPL CREATININE-BSD FRML MDRD: ABNORMAL ML/MIN/1.7M2
GLUCOSE SERPL-MCNC: 115 MG/DL (ref 70–99)
HCT VFR BLD AUTO: 38.4 % (ref 40–53)
HGB BLD-MCNC: 12.8 G/DL (ref 13.3–17.7)
MAGNESIUM SERPL-MCNC: 2 MG/DL (ref 1.6–2.3)
MCH RBC QN AUTO: 30.3 PG (ref 26.5–33)
MCHC RBC AUTO-ENTMCNC: 33.3 G/DL (ref 31.5–36.5)
MCV RBC AUTO: 91 FL (ref 78–100)
PHOSPHATE SERPL-MCNC: 2.6 MG/DL (ref 2.5–4.5)
PLATELET # BLD AUTO: 222 10E9/L (ref 150–450)
POTASSIUM SERPL-SCNC: 4.1 MMOL/L (ref 3.4–5.3)
RBC # BLD AUTO: 4.23 10E12/L (ref 4.4–5.9)
SODIUM SERPL-SCNC: 140 MMOL/L (ref 133–144)
WBC # BLD AUTO: 11.4 10E9/L (ref 4–11)

## 2017-06-02 PROCEDURE — 97161 PT EVAL LOW COMPLEX 20 MIN: CPT | Mod: GP

## 2017-06-02 PROCEDURE — 25000125 ZZHC RX 250: Performed by: COLON & RECTAL SURGERY

## 2017-06-02 PROCEDURE — 36415 COLL VENOUS BLD VENIPUNCTURE: CPT | Performed by: COLON & RECTAL SURGERY

## 2017-06-02 PROCEDURE — A9270 NON-COVERED ITEM OR SERVICE: HCPCS | Mod: GY | Performed by: COLON & RECTAL SURGERY

## 2017-06-02 PROCEDURE — 85027 COMPLETE CBC AUTOMATED: CPT | Performed by: COLON & RECTAL SURGERY

## 2017-06-02 PROCEDURE — 84100 ASSAY OF PHOSPHORUS: CPT | Performed by: COLON & RECTAL SURGERY

## 2017-06-02 PROCEDURE — 83735 ASSAY OF MAGNESIUM: CPT | Performed by: COLON & RECTAL SURGERY

## 2017-06-02 PROCEDURE — 97116 GAIT TRAINING THERAPY: CPT | Mod: GP

## 2017-06-02 PROCEDURE — 25000132 ZZH RX MED GY IP 250 OP 250 PS 637: Mod: GY | Performed by: COLON & RECTAL SURGERY

## 2017-06-02 PROCEDURE — 25000128 H RX IP 250 OP 636: Performed by: COLON & RECTAL SURGERY

## 2017-06-02 PROCEDURE — 12000007 ZZH R&B INTERMEDIATE

## 2017-06-02 PROCEDURE — 80048 BASIC METABOLIC PNL TOTAL CA: CPT | Performed by: COLON & RECTAL SURGERY

## 2017-06-02 PROCEDURE — 40000193 ZZH STATISTIC PT WARD VISIT

## 2017-06-02 RX ORDER — OXYCODONE AND ACETAMINOPHEN 5; 325 MG/1; MG/1
1 TABLET ORAL EVERY 4 HOURS PRN
Status: DISCONTINUED | OUTPATIENT
Start: 2017-06-02 | End: 2017-06-04

## 2017-06-02 RX ORDER — OXYCODONE AND ACETAMINOPHEN 5; 325 MG/1; MG/1
1 TABLET ORAL EVERY 4 HOURS PRN
Qty: 30 TABLET | Refills: 0 | Status: ON HOLD | OUTPATIENT
Start: 2017-06-02 | End: 2017-06-27

## 2017-06-02 RX ADMIN — ONDANSETRON 4 MG: 2 SOLUTION INTRAMUSCULAR; INTRAVENOUS at 09:21

## 2017-06-02 RX ADMIN — ALVIMOPAN 12 MG: 12 CAPSULE ORAL at 22:55

## 2017-06-02 RX ADMIN — AMLODIPINE BESYLATE 10 MG: 10 TABLET ORAL at 08:57

## 2017-06-02 RX ADMIN — CIPROFLOXACIN 400 MG: 2 INJECTION, SOLUTION INTRAVENOUS at 11:16

## 2017-06-02 RX ADMIN — ENOXAPARIN SODIUM 40 MG: 40 INJECTION SUBCUTANEOUS at 09:03

## 2017-06-02 RX ADMIN — ONDANSETRON 4 MG: 2 SOLUTION INTRAMUSCULAR; INTRAVENOUS at 17:01

## 2017-06-02 RX ADMIN — METRONIDAZOLE 500 MG: 500 INJECTION, SOLUTION INTRAVENOUS at 02:24

## 2017-06-02 RX ADMIN — ALVIMOPAN 12 MG: 12 CAPSULE ORAL at 08:57

## 2017-06-02 RX ADMIN — LISINOPRIL 20 MG: 10 TABLET ORAL at 08:57

## 2017-06-02 RX ADMIN — HYDROMORPHONE HYDROCHLORIDE: 10 INJECTION, SOLUTION INTRAMUSCULAR; INTRAVENOUS; SUBCUTANEOUS at 14:14

## 2017-06-02 RX ADMIN — METRONIDAZOLE 500 MG: 500 INJECTION, SOLUTION INTRAVENOUS at 10:18

## 2017-06-02 NOTE — OP NOTE
DATE OF SERVICE:  06/01/2017.      PREOPERATIVE DIAGNOSIS:  Left-sided colon cancer.      POSTOPERATIVE DIAGNOSIS:  Splenic flexure colon cancer.      PROCEDURES PERFORMED:   1.  Laparoscopic left colectomy.   2.  Intraoperative colonoscopy with tattooing.      STAFF SURGEON:  Darwin Lowry MD      FIRST ASSISTANT:  Debra Veronica MD, [facility] Colorectal Surgery Fellow      ANESTHESIA:  General.      ESTIMATED BLOOD LOSS:  25 mL.      SPECIMEN:  Splenic flexure resection, staple line distal.      FINDINGS:  There was omentum tethered to the colon at the splenic flexure in the left upper quadrant.  It was difficult to visualize the mass.  Therefore, we performed a flexible intraoperative colonoscopy to identify the lesion and it was tattooed, confirming the presence at the splenic flexure.  We based our resection off the left branch of the middle colic vessel and the left colic vessel.  We fully mobilized the sigmoid and descending colons and performed a transverse to sigmoid colon colocolonic anastomosis with an end-to-end handsewn fashion.      INDICATIONS:  Sallie Ceballos is a 71-year-old gentleman who recently underwent a colonoscopy with Dr. Estrella Epstein for heme-positive stool.  She identified a malignant-appearing lesion in the mid descending colon.  This was biopsied, but not tattooed.  Pathology demonstrated adenocarcinoma.  Mismatch repair enzymes were intact.  The patient was referred to me for surgical consultation.  He was staged with a CT scan of the chest, abdomen and pelvis which did not demonstrate any metastatic disease.  I recommended a laparoscopic left colectomy.  The risks, benefits and alternatives of surgery, including bleeding, infection, anastomotic leak, need for further surgery, development of a hernia, need for colostomy, urinary tract infection, DVT, complications of the anesthesia and even more serious complications, were discussed and he wishes to proceed.      DESCRIPTION OF  PROCEDURE:  After a full bowel prep, the patient was brought to the operating room, laid supine on the operating table.  General anesthesia was induced.  He was intubated without difficulty.  His right arm was tucked at his side.  His left arm was out on an arm board.  Pinto catheter was placed under sterile conditions.  He was placed in low modified lithotomy position.  All pressure points were inspected and padded appropriately.  He was secured to the table with tape across the chest.  The abdomen was shaved, prepped and draped in the usual sterile fashion.  Timeout was undertaken, which identified the patient and correct procedure.      We obtained laparoscopic entrance to the abdomen with a Veress needle technique.  Small stab incision was made in the left upper quadrant.  The Veress needle was passed without difficulty.  Pneumoperitoneum was established.  Then, a 12 mm optical trocar was placed at the supraumbilical position using optical techniques, safely entered into the abdomen.  We then surveyed the abdomen with a 10 mm 30-degree scope.  There was no evidence of metastatic disease and the mass was not immediately seen.  Two 5 mm ports were placed on the right side under direct visualization after ensuring that the Veress needle had caused no intra-abdominal injury and the Veress needle was removed.  An additional 5 mm port was placed on the patient's left side for additional exposure and retraction.  Through this instrumentation, we then explored the abdomen.  We identified the transverse and descending colons.  It was highly suspicious of a mass at the splenic flexure, although there was overlying tethered omentum in this area.  We then mobilized the omentum and the descending colon in a lateral medial fashion until we could get to the splenic flexure.      I was still not confident that I had adequately identified the mass and therefore placed the patient in a more neutral position after performing the  above maneuvers with the patient tilted with the left side up.  In the more neutral position, we then removed our laparoscopic instruments, but maintained our ports in their same position and maintained pneumoperitoneum.  I went to the perineal field and a colonoscope was brought into the operating room.  A well-lubricated colonoscope was then inserted in the anus and advanced to the mass, which I suspected was at the splenic flexure.  A digital rectal exam was unremarkable.  The prep was adequate.  I could clearly see the malignant lesion and the area was tattooed with injection of Evangelina ink totalling approximately 3 mL in 2 spots.  The scope was withdrawn.  I should note that the colonoscopy was done with CO2 insufflation.      Once the colonoscope was removed after aspirating as much of the insufflation as possible, I then rescrubbed into the case.  We redraped the legs with a sterile 3/4 sheet.  We reinserted our laparoscope through our 12 mm port.  Graspers were used to identify the splenic flexure, and indeed, we could identify that tattoo, confirming the location of the tumor at the splenic flexure.  We therefore continued with a full lateral to medial mobilization of the entire left colon.  The white line of Toldt was incised from the left iliac fossa up to the level of the splenic flexure.  I used a combination of the hook electrocautery as well as the LigaSure device to mobilize the colon and its mesocolon off the retroperitoneum.  The left ureter was identified and preserved throughout its entire course.  We then turned our attention to the transverse colon.  The omentum was retracted cephalad and we incised the attachments of the omentum to the transverse colon entering into the lesser sac.  Care was taken to leave a portion of the omentum on the tumor at the splenic flexure.  We then did a full mobilization of the splenic flexure, detaching the superior, lateral and inferior attachments.  The inferior  border of the pancreas was identified and the attachments off of it were incised to fully mobilize the splenic flexure.      Once this was done, the inferior mesenteric vein was identified and the middle colic vessels were identified.  The lesion seemed to be set more off of the left branch of the middle colic vessel as opposed to the descending colonic artery.  Therefore, I first took the vein by isolating the vessel around the bare area just lateral to the ligament of Treitz.  It was triply ligated and transected with the LigaSure device.  A point for proximal and distal transection was chosen.  The mesentery was then taken in a radial fashion with care to take the left branch of the middle colic artery at its origin.  The mesentery here proximally was taken up to the level of the bowel wall.  Similarly, the descending colic artery was identified after its takeoff from within the inferior mesenteric artery and was similarly resected in a radial fashion with our specimen.  The bowel was isolated here in the descending colon.  We then changed out our camera to a 5 mm 30-degree scope and brought it through our right upper quadrant trocar.  We brought up a 60 mm blue load powered Endo-HEENA stapler through our 12 mm trocar and then it was placed across the bowel distally where we had isolated the bowel and the bowel was transected with a single firing of the stapler.  The proximal and distal edges of the transected bowel were then grasped independently with graspers through our 5 mm trocars.        The patient was placed in a more neutral position.  We extended our periumbilical incision above the umbilicus a total of 4 cm.  Dissection was taken down with electrocautery and the fascia was incised in the midline.  A 5 mm Seth wound retractor was placed here and the tumor was nicely exteriorized.  We then exteriorized the bowel distal to the point where he had intracorporeally transected.  Great care was taken to ensure  that the bowel was not twisted as it was exteriorized.  We at that point removed all the remaining laparoscopic instruments.  The bowel where we planned to transect proximally had been isolated, and therefore, it was divided between straight bowel clamps.  The specimen was sent off as splenic flexure resection with a staple line distal.  The bowel was lined up for anastomosis.  We resected the distal staple line with electrocautery.  The cut edges were bleeding nicely.  We placed 2 stay sutures to appropriately lined up the bowel.  We then performed a handsewn colocolonic anastomosis in 2 layers.  The external layer was interrupted 3-0 Vicryl sutures and the inner layer was 2 running sutures of 2-0 Vicryl.  The back wall of sutures was placed first followed by the running internal layer followed by the front wall of the second layer.  The wound was widely patent.  The anastomosis was created with excellent blood supply and without any tension.  The anastomosis was returned into the abdomen.  The abdomen was then copiously irrigated and the aspirate was clear.      The fascia was reapproximated with 2 running sutures of looped 0 PDS.  Skin and subcutaneous fat was irrigated and closed with 4-0 Monocryl in a subcuticular fashion and 30 mL of 0.5% Marcaine plain was injected for local anesthesia and Dermabond was used as a dressing.  The patient was placed in supine position, awakened, extubated and transferred to the PACU in stable condition.  Lap, sponge and needle counts were correct at the end of the case x2.         DARWIN BERMAN MD             D: 2017 16:28   T: 2017 13:45   MT: TS      Name:     LEI MIRAMONTES   MRN:      [id number]        Account:        [id number]   :      1945           Procedure Date: 2017      Document: [id number]       cc: Darwin Epstein MD

## 2017-06-02 NOTE — PLAN OF CARE
Problem: Goal Outcome Summary  Goal: Goal Outcome Summary  PT: Orders received, eval completed, treatment initiated. Pt is s/p lap L colectomy. Prior to admit pt was living with spouse in an apartment, no AD use, independent with mobility. Pt demonstrates pain, dec activity tolerance and difficulty ambulating and would benefit from skilled PT services in order to improve this. Patient has met PT goals, will discharge from PT. Patient to ambulate with nursing staff 3x/day.     Discharge Planner PT   Patient plan for discharge: home  Current status: Currently requires SBA for transfers, gait of 400 ft with IV pole 300 ft and no  ft with steady balance. Rates pain 5/10 pre and post gait. Stood in the bathroom to urinate with steady balance and SBA.   Barriers to return to prior living situation: none  Recommendations for discharge: home  Rationale for recommendations: safe to return home with spouse       Entered by: Kady Murray 06/02/2017 4:18 PM      Physical Therapy Discharge Summary     Reason for therapy discharge:    All goals and outcomes met, no further needs identified.     Progress towards therapy goal(s). See goals on Care Plan in [facility] electronic health record for goal details.  Goals met     Therapy recommendation(s):    No further therapy is recommended.

## 2017-06-02 NOTE — ANESTHESIA POSTPROCEDURE EVALUATION
Patient: Sallie Ceballos    Procedure(s):  INTRAOP COLONOSCOPY WITH TATTOOING/ LAPAROSCOPIC ASSISTED LEFT COLECTOMY  - Wound Class: II-Clean Contaminated  Tattoo submucosal injection  - Wound Class: II-Clean Contaminated    Diagnosis:COLON CANCER  Diagnosis Additional Information: No value filed.    Anesthesia Type:  General, ETT    Note:  Anesthesia Post Evaluation    Patient location during evaluation: PACU  Patient participation: Able to fully participate in evaluation  Level of consciousness: awake  Pain management: adequate  Airway patency: patent  Cardiovascular status: acceptable  Respiratory status: acceptable  Hydration status: acceptable  PONV: controlled     Anesthetic complications: None          Last vitals:  Vitals:    06/01/17 1626 06/01/17 1703 06/01/17 1940   BP: 112/69 100/62 108/71   Pulse:      Resp: 12 12 14   Temp: 36.4  C (97.5  F) 36.6  C (97.8  F) 36.4  C (97.6  F)   SpO2: 100% 100% 100%         Electronically Signed By: Gladis Oviedo MD  June 1, 2017  7:57 PM

## 2017-06-02 NOTE — PHARMACY-CONSULT NOTE
Alvimopan (Entereg) Pharmacy Consult    Pharmacy has been consulted to review this patient and determine if they are an appropriate candidate for post-operative use of alvimopan.    Current approved uses for alvimopan include:  laproscopic, open, or converted partial bowel resection.    The following criteria must be met to be a post-operative candidate for alvimopan:    Must have received pre-operative dose of alvimopan    Surgery performed was an open, laproscopic, or converted partial bowel resection with primary anastomisis.    Patient must not have been on chronic narcotics before surgery  o Alvimopan contraindicated if patient has received therapeutic doses of opioids for >7 consecutive days before surgery  o Alvimopan not recommended if patient has received >3 opioid doses in the last 7 days before surgery (increase the risk of patient developing significant GI side effects)    No history of myocardial infarction    No bowel obstruction present (or recent surgery for bowel obstruction)    No End-stage renal disease present    No Severe hepatic impairment present    Patient did not have a pancreatic or gastric anastomosis    Ordering provider has received alvimopan educational materials    This patient does meet the criteria for appropriate use of alvimopan.    Dosing recommendation:  Take one 12 mg capsule by mouth twice daily for up to 15 total doses    1st dose given 30 minutes to 5 hours prior to surgery    Then one 12 mg capsule twice daily for up to 14 additional doses    STOP Alvimopan as soon as GI function returns (upon 1st bowel movement)    May NOT be continued as an outpatient  Note: Alvimopan is a hard gelatin capsule which may not be crushed or opened    Thank you,  Akil Aliheyder, [facility]

## 2017-06-02 NOTE — PLAN OF CARE
Problem: Goal Outcome Summary  Goal: Goal Outcome Summary  Outcome: No Change  Cantonese but understands and speaks English, pleasant with above procedure done on 6/1. VSS, Lap sites intact w DB. Denies pain and not using PCA but available. BS hypo, neg flatus. Tolerating clear liquids but on FL. LS clear down to 1 LNC with SpO2 WDL. IVF infusing. Dangled. Pinto out at 0400 so DTV 0800. Urinal at bedside. Needs to practice IS.. Not doing so well with it but does CDB. Labs this a.m. (BMP so glucose from labs)  HX: prediabetic, HTN, HLD, CAD- EF 65%, BPPV

## 2017-06-02 NOTE — PLAN OF CARE
Problem: Pain, Acute (Adult)  Goal: Identify Related Risk Factors and Signs and Symptoms  Related risk factors and signs and symptoms are identified upon initiation of Human Response Clinical Practice Guideline (CPG)  Outcome: Improving  Pt VSS, afebrile. PCA in use for pain control. Pt and family state understanding of plan of care. Lap sites x 4 CDI. Tolerated a full liquid diet. Bed alarm on and call light at hand.

## 2017-06-02 NOTE — PROGRESS NOTES
06/02/17 1543   Quick Adds   Type of Visit Initial PT Evaluation   Living Environment   Lives With spouse   Living Arrangements apartment   Home Accessibility no concerns   Number of Stairs to Enter Home 0   Number of Stairs Within Home 0   Self-Care   Usual Activity Tolerance good   Current Activity Tolerance fair   Regular Exercise no   Equipment Currently Used at Home none   Functional Level Prior   Ambulation 0-->independent   Transferring 0-->independent   Fall history within last six months no   Which of the above functional risks had a recent onset or change? ambulation   General Information   Onset of Illness/Injury or Date of Surgery - Date 06/01/17   Referring Physician Lluvia Fernandes PA-C   Patient/Family Goals Statement return home   Pertinent History of Current Problem (include personal factors and/or comorbidities that impact the POC) s/p lap L colectomy. PMH: colon ca, HTN, BPPV, prediabetes.   Precautions/Limitations fall precautions   Weight-Bearing Status - LLE full weight-bearing   Weight-Bearing Status - RLE full weight-bearing   Cognitive Status Examination   Orientation orientation to person, place and time   Level of Consciousness alert   Follows Commands and Answers Questions 100% of the time;able to follow single-step instructions   Personal Safety and Judgment intact   Pain Assessment   Patient Currently in Pain Yes, see Vital Sign flowsheet  (5/10 abdominal pain)   Posture    Posture Not impaired   Range of Motion (ROM)   ROM Quick Adds No deficits were identified   Strength   Strength Comments B hip flex NT, knee ext 5/5, DF 5/5   Bed Mobility   Bed Mobility Comments NT, pt in chair   Transfer Skills   Transfer Comments SBA sit to stand   Gait   Gait Comments Pt amb 10 ft with IV pole and CGA   Balance   Balance Comments Balance steady with IV poly, mildly dec without   General Therapy Interventions   Planned Therapy Interventions gait training   Clinical Impression   Criteria for  "Skilled Therapeutic Intervention yes, treatment indicated   PT Diagnosis Difficulty ambulating   Influenced by the following impairments Pain, dec activity tolerance   Functional limitations due to impairments Diffculty ambulating and transferring   Clinical Presentation Stable/Uncomplicated   Clinical Presentation Rationale medically stable post-op   Clinical Decision Making (Complexity) Low complexity   Therapy Frequency` daily   Predicted Duration of Therapy Intervention (days/wks) 1 day   Anticipated Discharge Disposition Home   Risk & Benefits of therapy have been explained Yes   Patient, Family & other staff in agreement with plan of care Yes   [facility] \"6 Clicks\"   2016, Trustees of [facility], under license to [employer].  All rights reserved.   6 Clicks Short Forms Basic Mobility Inpatient Short Form   [facility]  \"6 Clicks\" V.2 Basic Mobility Inpatient Short Form   1. Turning from your back to your side while in a flat bed without using bedrails? 4 - None   2. Moving from lying on your back to sitting on the side of a flat bed without using bedrails? 4 - None   3. Moving to and from a bed to a chair (including a wheelchair)? 3 - A Little   4. Standing up from a chair using your arms (e.g., wheelchair, or bedside chair)? 4 - None   5. To walk in hospital room? 3 - A Little   6. Climbing 3-5 steps with a railing? 3 - A Little   Basic Mobility Raw Score (Score out of 24.Lower scores equate to lower levels of function) 21   Total Evaluation Time   Total Evaluation Time (Minutes) 15     "

## 2017-06-02 NOTE — PROGRESS NOTES
COLON & RECTAL SURGERY  PROGRESS NOTE    June 2, 2017  Post-op Day # 1    SUBJECTIVE:  The patient states that he hasn't walked today but would like to with help. He states that he has some nausea with eating. Pain controlled.     OBJECTIVE:  Temp:  [97.5  F (36.4  C)-98.9  F (37.2  C)] 98.9  F (37.2  C)  Pulse:  [93] 93  Heart Rate:  [63-94] 94  Resp:  [9-22] 16  BP: ()/(61-88) 144/88  SpO2:  [94 %-100 %] 95 %    Intake/Output Summary (Last 24 hours) at 06/02/17 0948  Last data filed at 06/02/17 0555   Gross per 24 hour   Intake             3424 ml   Output             2515 ml   Net              909 ml       GENERAL:  Awake, alert, no acute distress,   HEAD - Nomocephalic atraumatic  SCLERA anicteric  EXTREMITIES warm and well perfused  ABDOMEN:  Soft, appropriately tender, non-distended,   INCISION:  C/d/i,     LABS:  Lab Results   Component Value Date    WBC 11.4 06/02/2017     Lab Results   Component Value Date    HGB 12.8 06/02/2017     Lab Results   Component Value Date    HCT 38.4 06/02/2017     Lab Results   Component Value Date     06/02/2017     Last Basic Metabolic Panel:  Lab Results   Component Value Date     06/02/2017      Lab Results   Component Value Date    POTASSIUM 4.1 06/02/2017     Lab Results   Component Value Date    CHLORIDE 107 06/02/2017     Lab Results   Component Value Date    ALBER 8.4 06/02/2017     Lab Results   Component Value Date    CO2 24 06/02/2017     Lab Results   Component Value Date    BUN 11 06/02/2017     Lab Results   Component Value Date    CR 0.84 06/02/2017     Lab Results   Component Value Date     06/02/2017       ASSESSMENT/PLAN:POD#1 Lap sigmoid colectomy for colon cancer  1. Full liquid diet  2. Continue PCA  3. OOB, ambulate, PT consult  4. Lovenox for prophylaxis  5. Decrease IVF  6. Lovenox for prophylaxis  7. Await return of bowel function    Lluvia Fernandes PA-C  [facility]  Phone: [phone]    CRS Staff    I  performed a history and physical examination of the patient and discussed their management with the physician assistant. I reviewed the physician assistants note and agree with the documented findings and plan of care.     tko fluids  Probably advance diet if no nausea tomorrow and likely d/c pca tomorrow.    Darwin Lowry MD  Colorectal Surgery  [phone] (office)  [phone] (pager)  [email or web address]

## 2017-06-02 NOTE — PHARMACY
27 day supply lovenox - $104    Thank you,  Esvin Shrestha CPhT  [facility] Discharge Pharmacy Liaison  [phone]

## 2017-06-02 NOTE — PLAN OF CARE
Problem: Pain, Acute (Adult)  Goal: Identify Related Risk Factors and Signs and Symptoms  Related risk factors and signs and symptoms are identified upon initiation of Human Response Clinical Practice Guideline (CPG)   Outcome: Improving  Cantonese but understands and speaks English, pleasant.  VSS, afebrile Lap sites intact w DB. Pt rates pain to the abdomen area at a 5 on a 0/10 pain scale using PCA for pain control. BS hypo, neg flatus. Advanced to full liquid diet, pt complains of nausea, appetite poor. Zofran IV x 2 given this shift. LS clear. IVF infusing. Ambulates with SBA, unsteady at times. Adequate urine otuput. Urinal at bedside, pt prefers to walk to bathroom to use urinal. Call light at hand and bed alarm on.

## 2017-06-03 PROCEDURE — 25000132 ZZH RX MED GY IP 250 OP 250 PS 637: Mod: GY | Performed by: COLON & RECTAL SURGERY

## 2017-06-03 PROCEDURE — 25000132 ZZH RX MED GY IP 250 OP 250 PS 637: Mod: GY | Performed by: PHYSICIAN ASSISTANT

## 2017-06-03 PROCEDURE — A9270 NON-COVERED ITEM OR SERVICE: HCPCS | Mod: GY | Performed by: COLON & RECTAL SURGERY

## 2017-06-03 PROCEDURE — A9270 NON-COVERED ITEM OR SERVICE: HCPCS | Mod: GY | Performed by: PHYSICIAN ASSISTANT

## 2017-06-03 PROCEDURE — 25000128 H RX IP 250 OP 636: Performed by: COLON & RECTAL SURGERY

## 2017-06-03 PROCEDURE — 12000007 ZZH R&B INTERMEDIATE

## 2017-06-03 RX ORDER — HYDROMORPHONE HYDROCHLORIDE 1 MG/ML
.3-.5 INJECTION, SOLUTION INTRAMUSCULAR; INTRAVENOUS; SUBCUTANEOUS
Status: CANCELLED | OUTPATIENT
Start: 2017-06-03

## 2017-06-03 RX ORDER — ACETAMINOPHEN 10 MG/ML
1000 INJECTION, SOLUTION INTRAVENOUS EVERY 8 HOURS
Status: CANCELLED | OUTPATIENT
Start: 2017-06-03 | End: 2017-06-04

## 2017-06-03 RX ADMIN — OXYCODONE HYDROCHLORIDE AND ACETAMINOPHEN 1 TABLET: 5; 325 TABLET ORAL at 23:32

## 2017-06-03 RX ADMIN — AMLODIPINE BESYLATE 10 MG: 10 TABLET ORAL at 08:31

## 2017-06-03 RX ADMIN — ONDANSETRON 4 MG: 2 SOLUTION INTRAMUSCULAR; INTRAVENOUS at 07:40

## 2017-06-03 RX ADMIN — ALVIMOPAN 12 MG: 12 CAPSULE ORAL at 23:32

## 2017-06-03 RX ADMIN — LISINOPRIL 20 MG: 10 TABLET ORAL at 08:30

## 2017-06-03 RX ADMIN — ENOXAPARIN SODIUM 40 MG: 40 INJECTION SUBCUTANEOUS at 08:30

## 2017-06-03 RX ADMIN — OXYCODONE HYDROCHLORIDE AND ACETAMINOPHEN 1 TABLET: 5; 325 TABLET ORAL at 12:46

## 2017-06-03 RX ADMIN — ALVIMOPAN 12 MG: 12 CAPSULE ORAL at 08:31

## 2017-06-03 NOTE — PROGRESS NOTES
Colon and Rectal Surgery Progress Note          Assessment and Plan:   POD #2 lap left colectomy    Seems to have a post op ileus    D/c pca  Start IV tylenol/IV toradol  Check bmp in am.    Darwin Lowry MD  Colorectal Surgery  [phone] (office)  [phone] (pager)  [email or web address]             Interval History:   Not passing gas.  Says he feels like something stuck in his stomach.  Not hungry.              Physical Exam:   Vitals were reviewed  Patient Vitals for the past 24 hrs:   BP Temp Temp src Pulse Heart Rate Resp SpO2   06/03/17 0800 (!) 136/95 98  F (36.7  C) Oral - 103 16 90 %   06/03/17 0500 - - - - - 16 -   06/03/17 0300 (!) 141/91 97.9  F (36.6  C) Oral - 92 18 99 %   06/03/17 0010 138/90 98  F (36.7  C) Oral - 90 16 97 %   06/02/17 2238 - - - - - 18 -   06/02/17 1959 - - - - - 16 -   06/02/17 1925 (!) 143/95 98.1  F (36.7  C) Oral 98 - 16 96 %   06/02/17 1539 124/82 97.9  F (36.6  C) Oral 88 - 16 97 %   06/02/17 1121 142/90 97.3  F (36.3  C) Oral 73 - 16 99 %         Intake/Output Summary (Last 24 hours) at 06/03/17 0924  Last data filed at 06/03/17 0656   Gross per 24 hour   Intake           772.83 ml   Output             1870 ml   Net         -1097.17 ml       Head - Nomocephalic atraumatic  Sclera anicteric  Extremities warm and well perfused  Lungs - breathing non labored,   Abdomen - soft, mildly distended, wounds look good  Rectal exam - deferred  Skin - no rash  Psych - affect appropriate  Neuro - no focal deficits             Data:   All laboratory and imaging data in the past 24 hours reviewed    CBC  Lab Results   Component Value Date    WBC 11.4 (H) 06/02/2017    WBC 9.5 09/19/2016    HGB 12.8 (L) 06/02/2017    HGB 16.0 09/19/2016    HCT 38.4 (L) 06/02/2017    HCT 46.2 09/19/2016     06/02/2017     06/01/2017     09/19/2016       BMP  Recent Labs   Lab Test  06/02/17   0744  06/01/17   1700  06/01/17   0937  09/19/16   2305   NA  140   --    --   138   POTASSIUM   4.1   --   4.1  4.0   CHLORIDE  107   --    --   105   CO2  24   --    --   26   ANIONGAP  9   --    --   7   GLC  115*   --   113*  118*   BUN  11   --    --   12   CR  0.84  0.87   --   0.94   ALBER  8.4*   --    --   8.2*       Liver Function Studies - No results for input(s): PROTTOTAL, ALBUMIN, BILITOTAL, ALKPHOS, AST, ALT, BILIDIRECT in the last 57239 hours.                   Medications:     Current Facility-Administered Medications Ordered in Epic   Medication Dose Route Frequency Last Rate Last Dose     [START ON 6/4/2017] enoxaparin (LOVENOX) injection 30 mg  30 mg Subcutaneous Q24H         oxyCODONE-acetaminophen (PERCOCET) 5-325 MG per tablet 1 tablet  1 tablet Oral Q4H PRN         lidocaine 1 % 1 mL  1 mL Other Q1H PRN         lidocaine (LMX4) cream   Topical Q1H PRN         sodium chloride (PF) 0.9% PF flush 3 mL  3 mL Intracatheter Q1H PRN         sodium chloride (PF) 0.9% PF flush 3 mL  3 mL Intracatheter Q8H   3 mL at 06/03/17 0546     lactated ringers BOLUS 500 mL  500 mL Intravenous Q4H PRN         lactated ringers infusion   Intravenous Continuous 10 mL/hr at 06/02/17 1021       naloxone (NARCAN) injection 0.1-0.4 mg  0.1-0.4 mg Intravenous Q2 Min PRN         diphenhydrAMINE (BENADRYL) injection 12.5 mg  12.5 mg Intravenous Q6H PRN         ondansetron (ZOFRAN) injection 4 mg  4 mg Intravenous Q6H PRN   4 mg at 06/03/17 0740     prochlorperazine (COMPAZINE) injection 5 mg  5 mg Intravenous Q6H PRN         alvimopan (ENTEREG) capsule 12 mg  12 mg Oral BID   12 mg at 06/03/17 0831     benzocaine-menthol (CHLORASEPTIC) 6-10 MG lozenge 1-2 lozenge  1-2 lozenge Buccal Q1H PRN         amLODIPine (NORVASC) tablet 10 mg  10 mg Oral Daily   10 mg at 06/03/17 0831    And     lisinopril (PRINIVIL/ZESTRIL) tablet 20 mg  20 mg Oral Daily   20 mg at 06/03/17 0830     Current Outpatient Prescriptions Ordered in Epic   Medication     oxyCODONE-acetaminophen (PERCOCET) 5-325 MG per tablet     enoxaparin (LOVENOX) 40  MG/0.4ML injection

## 2017-06-03 NOTE — PLAN OF CARE
Problem: Goal Outcome Summary  Goal: Goal Outcome Summary  Outcome: Improving  AVSS ex tachycardic at times. Lap sites dermabonded, DAO, ecchymotic but WNL. BS faint, denies passing flatus. Required some reminding of using PCA, at 0.1. Rates pain 5/10. Tolerating full liquid, denied any nausea on shift. Adequate UOP, but frequency and hesitancy noted throughout NOC, 100-200 Q hour. Ambulates frequently with SBA.

## 2017-06-03 NOTE — PROGRESS NOTES
Pt refused breakfast this a.m. States nausea and pain to abdomen area. Up to the bathroom with urinary frequency.

## 2017-06-03 NOTE — PLAN OF CARE
Problem: Pain, Acute (Adult)  Goal: Identify Related Risk Factors and Signs and Symptoms  Related risk factors and signs and symptoms are identified upon initiation of Human Response Clinical Practice Guideline (CPG)   Outcome: Improving  Cantonese but understands and speaks English, pleasant.  VSS, afebrile Lap sites intact w DB. Pt rates pain to the abdomen area at a 5 on a 0/10 pain scale. PCA discontinued this a.m. Percocet  PO given for pain control. BS hypo, neg flatus. Tolerating  full liquid diet, pt complains of nausea this a.m. IV Zofran given with good relief. Appetite poor. LS clear.Saline locked.  Ambulates with SBA, steady gait. Adequate urine otuput. Urinal at bedside, pt prefers to walk to bathroom to use urinal. Call light at hand and bed alarm on.

## 2017-06-04 LAB
ANION GAP SERPL CALCULATED.3IONS-SCNC: 9 MMOL/L (ref 3–14)
BUN SERPL-MCNC: 14 MG/DL (ref 7–30)
CALCIUM SERPL-MCNC: 9.1 MG/DL (ref 8.5–10.1)
CHLORIDE SERPL-SCNC: 101 MMOL/L (ref 94–109)
CO2 SERPL-SCNC: 26 MMOL/L (ref 20–32)
CREAT SERPL-MCNC: 0.78 MG/DL (ref 0.66–1.25)
GFR SERPL CREATININE-BSD FRML MDRD: ABNORMAL ML/MIN/1.7M2
GLUCOSE SERPL-MCNC: 139 MG/DL (ref 70–99)
PLATELET # BLD AUTO: 267 10E9/L (ref 150–450)
POTASSIUM SERPL-SCNC: 3.5 MMOL/L (ref 3.4–5.3)
SODIUM SERPL-SCNC: 136 MMOL/L (ref 133–144)

## 2017-06-04 PROCEDURE — 25000132 ZZH RX MED GY IP 250 OP 250 PS 637: Mod: GY | Performed by: COLON & RECTAL SURGERY

## 2017-06-04 PROCEDURE — 40000141 ZZH STATISTIC PERIPHERAL IV START W/O US GUIDANCE

## 2017-06-04 PROCEDURE — 36415 COLL VENOUS BLD VENIPUNCTURE: CPT | Performed by: COLON & RECTAL SURGERY

## 2017-06-04 PROCEDURE — 12000007 ZZH R&B INTERMEDIATE

## 2017-06-04 PROCEDURE — A9270 NON-COVERED ITEM OR SERVICE: HCPCS | Mod: GY | Performed by: COLON & RECTAL SURGERY

## 2017-06-04 PROCEDURE — 25000128 H RX IP 250 OP 636: Performed by: COLON & RECTAL SURGERY

## 2017-06-04 PROCEDURE — 80048 BASIC METABOLIC PNL TOTAL CA: CPT | Performed by: COLON & RECTAL SURGERY

## 2017-06-04 PROCEDURE — 85049 AUTOMATED PLATELET COUNT: CPT | Performed by: COLON & RECTAL SURGERY

## 2017-06-04 RX ORDER — KETOROLAC TROMETHAMINE 15 MG/ML
15 INJECTION, SOLUTION INTRAMUSCULAR; INTRAVENOUS EVERY 6 HOURS PRN
Status: DISCONTINUED | OUTPATIENT
Start: 2017-06-04 | End: 2017-06-05 | Stop reason: HOSPADM

## 2017-06-04 RX ORDER — KETOROLAC TROMETHAMINE 15 MG/ML
15 INJECTION, SOLUTION INTRAMUSCULAR; INTRAVENOUS EVERY 6 HOURS PRN
Status: DISCONTINUED | OUTPATIENT
Start: 2017-06-04 | End: 2017-06-04

## 2017-06-04 RX ORDER — ACETAMINOPHEN 325 MG/1
325-650 TABLET ORAL EVERY 6 HOURS PRN
Status: DISCONTINUED | OUTPATIENT
Start: 2017-06-04 | End: 2017-06-05 | Stop reason: HOSPADM

## 2017-06-04 RX ORDER — OXYCODONE HYDROCHLORIDE 5 MG/1
5-10 TABLET ORAL EVERY 4 HOURS PRN
Status: DISCONTINUED | OUTPATIENT
Start: 2017-06-04 | End: 2017-06-05 | Stop reason: HOSPADM

## 2017-06-04 RX ADMIN — ONDANSETRON 4 MG: 2 SOLUTION INTRAMUSCULAR; INTRAVENOUS at 03:21

## 2017-06-04 RX ADMIN — ENOXAPARIN SODIUM 30 MG: 30 INJECTION SUBCUTANEOUS at 09:29

## 2017-06-04 RX ADMIN — ALVIMOPAN 12 MG: 12 CAPSULE ORAL at 09:27

## 2017-06-04 RX ADMIN — LISINOPRIL 20 MG: 10 TABLET ORAL at 09:26

## 2017-06-04 RX ADMIN — AMLODIPINE BESYLATE 10 MG: 10 TABLET ORAL at 09:26

## 2017-06-04 RX ADMIN — ONDANSETRON 4 MG: 2 SOLUTION INTRAMUSCULAR; INTRAVENOUS at 17:06

## 2017-06-04 RX ADMIN — OXYCODONE HYDROCHLORIDE 5 MG: 5 TABLET ORAL at 13:11

## 2017-06-04 RX ADMIN — PROCHLORPERAZINE EDISYLATE 5 MG: 5 INJECTION INTRAMUSCULAR; INTRAVENOUS at 06:59

## 2017-06-04 NOTE — PLAN OF CARE
Problem: Goal Outcome Summary  Goal: Goal Outcome Summary  Outcome: Improving  [id number] RN: Tachycardic in 110s, other VSS. Ambulating in ellis with SBA. +flatus, small soft BM. Abd incisions CDI. Voiding without difficulty. Tolerating low fiber diet. LS clear.

## 2017-06-04 NOTE — PLAN OF CARE
Problem: Goal Outcome Summary  Goal: Goal Outcome Summary  No visitors seen today. Pt makes needs known. Ambulates whenever instructed. Poor appetite. Ensure encouraged. No c/o nausea this shift. Lucinda Agudelo RN

## 2017-06-04 NOTE — PLAN OF CARE
"Problem: Goal Outcome Summary  Goal: Goal Outcome Summary  Outcome: Improving  A&O, AVSS. BS returning, audible, passing flatus, although pt still c/o nausea. States he might \"feel better if (he) just threw up\".  Zofran and Compazine given. Pain controlled with percocet 1 tab. Ambulating SBA. Voiding per urinal, frequency noted. Incisions DAO, bruised but WNL.       "

## 2017-06-04 NOTE — PROGRESS NOTES
Colon and Rectal Surgery Progress Note          Assessment and Plan:   POD #3 lap colectomy    D/c percocet  Oral pain meds  Advance diet    Darwin Lowry MD  Colorectal Surgery  [phone] (office)  [phone] (pager)  [email or web address]             Interval History:   Passing gas, but no bm.  Not very hungry              Physical Exam:   Vitals were reviewed  Patient Vitals for the past 24 hrs:   BP Temp Temp src Pulse Heart Rate Resp SpO2   06/04/17 0728 130/89 98  F (36.7  C) Oral 107 107 16 94 %   06/04/17 0000 119/80 97.5  F (36.4  C) Oral - 86 16 -   06/03/17 1917 123/81 97.8  F (36.6  C) Oral 93 - 16 95 %   06/03/17 1543 118/78 97.9  F (36.6  C) Oral - 99 16 93 %   06/03/17 1116 (!) 125/97 98.4  F (36.9  C) Oral - 111 16 96 %   06/03/17 0800 (!) 136/95 98  F (36.7  C) Oral - 103 16 90 %         Intake/Output Summary (Last 24 hours) at 06/04/17 0752  Last data filed at 06/04/17 0600   Gross per 24 hour   Intake              240 ml   Output             1525 ml   Net            -1285 ml       Head - Nomocephalic atraumatic  Sclera anicteric  Extremities warm and well perfused  Lungs - breathing non labored,   Abdomen - soft, minimally distended, appropriately tender, wounds look good  Rectal exam - deferred  Skin - no rash  Psych - affect appropriate  Neuro - no focal deficits             Data:   All laboratory and imaging data in the past 24 hours reviewed    CBC  Lab Results   Component Value Date    WBC 11.4 (H) 06/02/2017    WBC 9.5 09/19/2016    HGB 12.8 (L) 06/02/2017    HGB 16.0 09/19/2016    HCT 38.4 (L) 06/02/2017    HCT 46.2 09/19/2016     06/04/2017     06/02/2017     06/01/2017       BMP  Recent Labs   Lab Test  06/02/17   0744  06/01/17   1700  06/01/17   0937  09/19/16   2305   NA  140   --    --   138   POTASSIUM  4.1   --   4.1  4.0   CHLORIDE  107   --    --   105   CO2  24   --    --   26   ANIONGAP  9   --    --   7   GLC  115*   --   113*  118*   BUN  11   --    --   12    CR  0.84  0.87   --   0.94   ALBER  8.4*   --    --   8.2*       Liver Function Studies - No results for input(s): PROTTOTAL, ALBUMIN, BILITOTAL, ALKPHOS, AST, ALT, BILIDIRECT in the last 66613 hours.               Medications:     Current Facility-Administered Medications Ordered in Epic   Medication Dose Route Frequency Last Rate Last Dose     oxyCODONE (ROXICODONE) IR tablet 5-10 mg  5-10 mg Oral Q4H PRN         acetaminophen (TYLENOL) tablet 325-650 mg  325-650 mg Oral Q6H PRN         ketorolac (TORADOL) injection 15 mg  15 mg Intravenous Q6H PRN         enoxaparin (LOVENOX) injection 30 mg  30 mg Subcutaneous Q24H         lidocaine 1 % 1 mL  1 mL Other Q1H PRN         lidocaine (LMX4) cream   Topical Q1H PRN         sodium chloride (PF) 0.9% PF flush 3 mL  3 mL Intracatheter Q1H PRN         sodium chloride (PF) 0.9% PF flush 3 mL  3 mL Intracatheter Q8H   3 mL at 06/03/17 2332     lactated ringers BOLUS 500 mL  500 mL Intravenous Q4H PRN         lactated ringers infusion   Intravenous Continuous 10 mL/hr at 06/02/17 1021       naloxone (NARCAN) injection 0.1-0.4 mg  0.1-0.4 mg Intravenous Q2 Min PRN         diphenhydrAMINE (BENADRYL) injection 12.5 mg  12.5 mg Intravenous Q6H PRN         ondansetron (ZOFRAN) injection 4 mg  4 mg Intravenous Q6H PRN   4 mg at 06/04/17 0321     prochlorperazine (COMPAZINE) injection 5 mg  5 mg Intravenous Q6H PRN   5 mg at 06/04/17 0659     alvimopan (ENTEREG) capsule 12 mg  12 mg Oral BID   12 mg at 06/03/17 2332     benzocaine-menthol (CHLORASEPTIC) 6-10 MG lozenge 1-2 lozenge  1-2 lozenge Buccal Q1H PRN         amLODIPine (NORVASC) tablet 10 mg  10 mg Oral Daily   10 mg at 06/03/17 0831    And     lisinopril (PRINIVIL/ZESTRIL) tablet 20 mg  20 mg Oral Daily   20 mg at 06/03/17 0830     Current Outpatient Prescriptions Ordered in Epic   Medication     oxyCODONE-acetaminophen (PERCOCET) 5-325 MG per tablet     enoxaparin (LOVENOX) 40 MG/0.4ML injection

## 2017-06-04 NOTE — PLAN OF CARE
Problem: Goal Outcome Summary  Goal: Goal Outcome Summary  Pt does not comprehend concept of giving lovenox to self. Given by nurse. Lucinda Agudelo RN

## 2017-06-05 VITALS
OXYGEN SATURATION: 96 % | DIASTOLIC BLOOD PRESSURE: 94 MMHG | HEART RATE: 78 BPM | TEMPERATURE: 97.4 F | WEIGHT: 114 LBS | BODY MASS INDEX: 20.98 KG/M2 | HEIGHT: 62 IN | SYSTOLIC BLOOD PRESSURE: 128 MMHG | RESPIRATION RATE: 16 BRPM

## 2017-06-05 LAB — COPATH REPORT: NORMAL

## 2017-06-05 PROCEDURE — 25000128 H RX IP 250 OP 636: Performed by: COLON & RECTAL SURGERY

## 2017-06-05 PROCEDURE — 25000132 ZZH RX MED GY IP 250 OP 250 PS 637: Mod: GY | Performed by: COLON & RECTAL SURGERY

## 2017-06-05 PROCEDURE — A9270 NON-COVERED ITEM OR SERVICE: HCPCS | Mod: GY | Performed by: COLON & RECTAL SURGERY

## 2017-06-05 RX ADMIN — ENOXAPARIN SODIUM 30 MG: 30 INJECTION SUBCUTANEOUS at 08:50

## 2017-06-05 RX ADMIN — LISINOPRIL 20 MG: 10 TABLET ORAL at 08:50

## 2017-06-05 RX ADMIN — AMLODIPINE BESYLATE 10 MG: 10 TABLET ORAL at 08:50

## 2017-06-05 NOTE — PROGRESS NOTES
COLON & RECTAL SURGERY  PROGRESS NOTE    June 5, 2017  Post-op Day # 4    SUBJECTIVE:  The patient is having bowel movements and passing gas. States that his belly feels much better. Pain controlled and tolerating a diet.       OBJECTIVE:  Temp:  [97.2  F (36.2  C)-98.9  F (37.2  C)] 97.4  F (36.3  C)  Pulse:  [] 78  Heart Rate:  [104-119] 104  Resp:  [16-18] 16  BP: (117-130)/(74-94) 128/94  SpO2:  [94 %-97 %] 96 %    Intake/Output Summary (Last 24 hours) at 06/05/17 0825  Last data filed at 06/05/17 0500   Gross per 24 hour   Intake              540 ml   Output              475 ml   Net               65 ml       GENERAL:  Awake, alert, no acute distress,   HEAD - Nomocephalic atraumatic  SCLERA anicteric  EXTREMITIES warm and well perfused  ABDOMEN:  Soft, appropriately tender, non-distended,   INCISION:  C/d/i,     LABS:  Lab Results   Component Value Date    WBC 11.4 06/02/2017     Lab Results   Component Value Date    HGB 12.8 06/02/2017     Lab Results   Component Value Date    HCT 38.4 06/02/2017     Lab Results   Component Value Date     06/04/2017     Last Basic Metabolic Panel:  Lab Results   Component Value Date     06/04/2017      Lab Results   Component Value Date    POTASSIUM 3.5 06/04/2017     Lab Results   Component Value Date    CHLORIDE 101 06/04/2017     Lab Results   Component Value Date    ALBER 9.1 06/04/2017     Lab Results   Component Value Date    CO2 26 06/04/2017     Lab Results   Component Value Date    BUN 14 06/04/2017     Lab Results   Component Value Date    CR 0.78 06/04/2017     Lab Results   Component Value Date     06/04/2017       ASSESSMENT/PLAN:POD#4 lap colectomy for colon cancer  1. Low fiber diet  2. PO PRN pain meds  3. OOB, ambulate  4. Dispo: Plan to d/c home today with robbin Fernandes PA-C  [facility]  Phone: [phone]

## 2017-06-05 NOTE — DISCHARGE SUMMARY
[facility] Discharge Summary      Sallie Ceballos MRN# [id number]   Age: 71 year old YOB: 1945     Date of Admission:  6/1/2017  Date of Discharge::  6/5/2017 11:09 AM  Admitting Physician:  Darwin Lowry MD  Discharge Physician:  Darwin Lowry MD     PCP:  None    Disposition: Patient discharged from [facility] to home in stable condition.        Primary Diagnosis:   Colon cancer with metastasis to lymph nodes            Discharge Medications:   Discharge Medication List as of 6/5/2017 10:13 AM      START taking these medications    Details   oxyCODONE-acetaminophen (PERCOCET) 5-325 MG per tablet Take 1 tablet by mouth every 4 hours as needed for moderate to severe pain, Disp-30 tablet, R-0, Local Print         CONTINUE these medications which have CHANGED    Details   enoxaparin (LOVENOX) 40 MG/0.4ML injection Inject 0.4 mLs (40 mg) Subcutaneous every 24 hours, Disp-26 Syringe, R-0, E-Prescribe         CONTINUE these medications which have NOT CHANGED    Details   amLODIPine-benazepril (LOTREL) 10-20 MG per capsule Take 1 capsule by mouth daily, Historical                    Follow Up, Special Instructions:   Discharge diet: Low residue   Discharge activity: No lifting, driving, or strenuous exercise for 6 week(s)   Discharge follow-up: Follow up with Dr. Lowry in 3-4 weeks   Wound care: Keep wound clean and dry              Procedures:   Procedure(s): Laparoscopic left colectomy and intraoperative colonoscopy with tattooing               Consultations:   PT          Brief Hospital Summary:   Patient is a 71 year old man.  he underwent lap left colectomy on 6/1/17 by Dr. Lowry.   There were no immediate complications during this procedure.    Please refer to the full operative summary for details.  The patient's hospital course was unremarkable. She had full return of bowel movements, tolerated a diet and his pain was controlled.  he recovered as anticipated and experienced  no post-operative complications.         Attestation:  I have reviewed today's vital signs, notes, medications, labs and imaging.    Lluvia Fernandes PA-C  [facility]          ADDENDUM:  Length of stay: 4 days  Indicate Y or N for the following:  UTI  No  C diff  No  PNA  No  SSI No  DVT No  PE  No  CVA No  MI No  Enterocutaneous fistula  No  Peripheral nerve injury  No  Abscess (not adjacent to anastomosis)  No  Leak No    Treated with:   Antibiotics N/A   Drain  N/A   Reoperation  N/A  Death within 30 days No  Reintubation  No  Reoperation  No   Procedure NA    FOR CANCER CASES:  T stage (1,2,3,4): 3  N stage (0,1,2): 1b   Total number of nodes: 17   Total positive: 2  M stage (0,1):   R (0,1,2):   TME grade, if known (1,2,3):   MSI (pos, neg): neg

## 2017-06-05 NOTE — PLAN OF CARE
Problem: Goal Outcome Summary  Goal: Goal Outcome Summary  Outcome: Improving  VSS, denies pain, incisions WNL, up ambulating in room and hallways, tolerating meals, +gas, +bm, dc home with wife, all teaching complete, Lovenox teaching demonstrated with patient and wife, video watched, verbalized understanding, rx filled and given to the patient, dc home with wife. Follow up instructions reviewed.

## 2017-06-05 NOTE — DISCHARGE INSTRUCTIONS
Discharge Instructions following Abdominal Surgery  [facility]      Bowel Function  After removal of a portion of the intestines, it is normal for bowel movements to be erratic.  They are often looser and more frequent.  This condition generally resolves within a few weeks or it can be controlled with diet or medication.  Diet  In general, you may return to a well-balanced diet upon discharge.  Your doctor will let you know when to add extra fiber to your diet.  Be sure to drink plenty of fluids.  Incision  You should expect some discomfort in the area of your incision, particularly as you increase your activity.  If you notice an area of increasing redness or new drainage, please call your doctor.  You may shower as desired but refrain from tub baths or swimming until the incisions are fully healed.  Activity  Gradually increase your activity each day.  There are generally no restrictions on walking, climbing stairs, or riding in a car.  You can usually drive a car 7-10 days after your leave the hospital.  Do not drive while taking narcotic pain medications.  Ask your doctor regarding resumption of physical sexual activity; in most cases, you can resume sex after a few weeks.  Your physician will advise you about when to return to work.  It is preferable to have somebody stay with you at home until you are fully healed and able to resume a normal level of activity   Medications  You will be discharged with a prescription pain medication and any medications you were taking prior to surgery.  Do not drive while taking narcotic pain medications.  If you need additional medications or a refill, you must call your doctor during normal business hours.  It is the policy of [facility] to not refill pain medication after hours or on weekends because your chart is not available.  Follow-up  Typically, you will be asked to schedule a follow-up office  visit 1 to 4 weeks after surgery.  If you have any questions related to follow-up, medications, or your condition, please call the office.      Call your surgeon if you have these signs or symptoms:  ([phone])    Problem with the incision, including increasing pain, swelling, redness, or drainage    Increasing abdominal pain    Uncontrolled nausea or vomiting    Fever or chills    Constipation  (no bowel movement for 3 days)    Diarrhea (more than 3 watery stools within 24 hours)    Bleeding from the rectum, wound, or stoma    Any questions or concerns

## 2017-06-05 NOTE — PLAN OF CARE
Problem: Goal Outcome Summary  Goal: Goal Outcome Summary  Outcome: Improving  A&O.  Afebrile.  Tachycardic baseline.  Denies pain.  Incisions CDI.  Saline locked.  Voiding.

## 2017-06-05 NOTE — PLAN OF CARE
Problem: Goal Outcome Summary  Goal: Goal Outcome Summary  Outcome: No Change  A&Ox4.  AVSS.  Denied pain or need for pain medication all night.  Up to bathroom and ambulate in the ellis with SBA.  Voiding without difficulty.  (+)BM.  Lap sites DAO, CDI.  Refused IS overnight.  Slept good in between care.

## 2017-06-19 ENCOUNTER — TRANSFERRED RECORDS (OUTPATIENT)
Dept: HEALTH INFORMATION MANAGEMENT | Facility: CLINIC | Age: 72
End: 2017-06-19

## 2017-06-27 ENCOUNTER — SURGERY (OUTPATIENT)
Age: 72
End: 2017-06-27

## 2017-06-27 ENCOUNTER — HOSPITAL ENCOUNTER (OUTPATIENT)
Facility: CLINIC | Age: 72
Discharge: HOME | End: 2017-06-27
Attending: THORACIC SURGERY (CARDIOTHORACIC VASCULAR SURGERY) | Admitting: THORACIC SURGERY (CARDIOTHORACIC VASCULAR SURGERY)
Payer: MEDICARE

## 2017-06-27 VITALS
SYSTOLIC BLOOD PRESSURE: 115 MMHG | BODY MASS INDEX: 20.32 KG/M2 | HEIGHT: 62 IN | RESPIRATION RATE: 18 BRPM | OXYGEN SATURATION: 99 % | HEART RATE: 72 BPM | TEMPERATURE: 97.4 F | WEIGHT: 110.4 LBS | DIASTOLIC BLOOD PRESSURE: 78 MMHG

## 2017-06-27 PROCEDURE — T1013 SIGN LANG/ORAL INTERPRETER: HCPCS | Mod: U3

## 2017-06-27 PROCEDURE — 40000879 ZZH CANCELLED SURGERY UP TO 16-30 MINS: Performed by: THORACIC SURGERY (CARDIOTHORACIC VASCULAR SURGERY)

## 2017-06-27 RX ORDER — HEPARIN SODIUM 1000 [USP'U]/ML
INJECTION, SOLUTION INTRAVENOUS; SUBCUTANEOUS
Status: DISCONTINUED
Start: 2017-06-27 | End: 2017-06-27 | Stop reason: HOSPADM

## 2017-06-27 RX ORDER — LIDOCAINE HYDROCHLORIDE 10 MG/ML
INJECTION, SOLUTION INFILTRATION; PERINEURAL
Status: DISCONTINUED
Start: 2017-06-27 | End: 2017-06-27 | Stop reason: HOSPADM

## 2017-06-27 NOTE — PROGRESS NOTES
Thoracic Surgery:  Case cancelled due to patient took his Lovenox this morning. Discussed reason for cancellation and rescheduling to Thursday afternoon.  Patient has a port draw on June 30 and starts chemo on July 3. Out , Pili, will call patient's daughter, Sugar, to confirm that case will be rescheduled for Thursday, June 29 at 1330 and tro clarify Lovenox stop date and time.  Nita Freed PA-C with Dr. Alex Martinez  [facility]  Cell [phone]

## 2017-06-27 NOTE — OR NURSING
Pt states he took his Lovenox this am.  Dr Marr and Dr Martinez notified.  Nita Freed here to speak to pt about rescheduling.

## 2017-06-29 ENCOUNTER — APPOINTMENT (OUTPATIENT)
Dept: GENERAL RADIOLOGY | Facility: CLINIC | Age: 72
End: 2017-06-29
Attending: THORACIC SURGERY (CARDIOTHORACIC VASCULAR SURGERY)
Payer: MEDICARE

## 2017-06-29 ENCOUNTER — SURGERY (OUTPATIENT)
Age: 72
End: 2017-06-29

## 2017-06-29 ENCOUNTER — HOSPITAL ENCOUNTER (OUTPATIENT)
Facility: CLINIC | Age: 72
Discharge: HOME | End: 2017-06-29
Attending: THORACIC SURGERY (CARDIOTHORACIC VASCULAR SURGERY) | Admitting: THORACIC SURGERY (CARDIOTHORACIC VASCULAR SURGERY)
Payer: MEDICARE

## 2017-06-29 ENCOUNTER — ANESTHESIA EVENT (OUTPATIENT)
Dept: SURGERY | Facility: CLINIC | Age: 72
End: 2017-06-29
Payer: MEDICARE

## 2017-06-29 ENCOUNTER — ANESTHESIA (OUTPATIENT)
Dept: SURGERY | Facility: CLINIC | Age: 72
End: 2017-06-29
Payer: MEDICARE

## 2017-06-29 VITALS
WEIGHT: 110 LBS | SYSTOLIC BLOOD PRESSURE: 128 MMHG | OXYGEN SATURATION: 100 % | HEIGHT: 62 IN | HEART RATE: 74 BPM | DIASTOLIC BLOOD PRESSURE: 85 MMHG | RESPIRATION RATE: 14 BRPM | BODY MASS INDEX: 20.24 KG/M2 | TEMPERATURE: 97.3 F

## 2017-06-29 DIAGNOSIS — C18.9 MALIGNANT NEOPLASM OF COLON, UNSPECIFIED PART OF COLON (H): Primary | ICD-10-CM

## 2017-06-29 PROCEDURE — 37000008 ZZH ANESTHESIA TECHNICAL FEE, 1ST 30 MIN: Performed by: THORACIC SURGERY (CARDIOTHORACIC VASCULAR SURGERY)

## 2017-06-29 PROCEDURE — 36000052 ZZH SURGERY LEVEL 2 EA 15 ADDTL MIN: Performed by: THORACIC SURGERY (CARDIOTHORACIC VASCULAR SURGERY)

## 2017-06-29 PROCEDURE — 25000128 H RX IP 250 OP 636: Performed by: NURSE ANESTHETIST, CERTIFIED REGISTERED

## 2017-06-29 PROCEDURE — 25000125 ZZHC RX 250: Performed by: NURSE ANESTHETIST, CERTIFIED REGISTERED

## 2017-06-29 PROCEDURE — 37000009 ZZH ANESTHESIA TECHNICAL FEE, EACH ADDTL 15 MIN: Performed by: THORACIC SURGERY (CARDIOTHORACIC VASCULAR SURGERY)

## 2017-06-29 PROCEDURE — 25000128 H RX IP 250 OP 636: Performed by: THORACIC SURGERY (CARDIOTHORACIC VASCULAR SURGERY)

## 2017-06-29 PROCEDURE — C1788 PORT, INDWELLING, IMP: HCPCS | Performed by: THORACIC SURGERY (CARDIOTHORACIC VASCULAR SURGERY)

## 2017-06-29 PROCEDURE — 71000027 ZZH RECOVERY PHASE 2 EACH 15 MINS: Performed by: THORACIC SURGERY (CARDIOTHORACIC VASCULAR SURGERY)

## 2017-06-29 PROCEDURE — 71000012 ZZH RECOVERY PHASE 1 LEVEL 1 FIRST HR: Performed by: THORACIC SURGERY (CARDIOTHORACIC VASCULAR SURGERY)

## 2017-06-29 PROCEDURE — 27210794 ZZH OR GENERAL SUPPLY STERILE: Performed by: THORACIC SURGERY (CARDIOTHORACIC VASCULAR SURGERY)

## 2017-06-29 PROCEDURE — 25000125 ZZHC RX 250: Performed by: THORACIC SURGERY (CARDIOTHORACIC VASCULAR SURGERY)

## 2017-06-29 PROCEDURE — 40000985 XR CHEST PORT 1 VW

## 2017-06-29 PROCEDURE — 36000050 ZZH SURGERY LEVEL 2 1ST 30 MIN: Performed by: THORACIC SURGERY (CARDIOTHORACIC VASCULAR SURGERY)

## 2017-06-29 PROCEDURE — 40000170 ZZH STATISTIC PRE-PROCEDURE ASSESSMENT II: Performed by: THORACIC SURGERY (CARDIOTHORACIC VASCULAR SURGERY)

## 2017-06-29 DEVICE — CATH PORT POWERPORT CLEARVUE ISP 8FR 5608062
Type: IMPLANTABLE DEVICE | Site: CHEST | Status: NON-FUNCTIONAL
Removed: 2018-08-06

## 2017-06-29 RX ORDER — HEPARIN SODIUM (PORCINE) LOCK FLUSH IV SOLN 100 UNIT/ML 100 UNIT/ML
SOLUTION INTRAVENOUS PRN
Status: DISCONTINUED | OUTPATIENT
Start: 2017-06-29 | End: 2017-06-29 | Stop reason: HOSPADM

## 2017-06-29 RX ORDER — HYDROCODONE BITARTRATE AND ACETAMINOPHEN 5; 325 MG/1; MG/1
1 TABLET ORAL EVERY 6 HOURS PRN
Qty: 6 TABLET | Refills: 0 | Status: SHIPPED | OUTPATIENT
Start: 2017-06-29

## 2017-06-29 RX ORDER — NALOXONE HYDROCHLORIDE 0.4 MG/ML
.1-.4 INJECTION, SOLUTION INTRAMUSCULAR; INTRAVENOUS; SUBCUTANEOUS
Status: DISCONTINUED | OUTPATIENT
Start: 2017-06-29 | End: 2017-06-29 | Stop reason: HOSPADM

## 2017-06-29 RX ORDER — IBUPROFEN 600 MG/1
600 TABLET, FILM COATED ORAL
Status: DISCONTINUED | OUTPATIENT
Start: 2017-06-29 | End: 2017-06-29 | Stop reason: HOSPADM

## 2017-06-29 RX ORDER — HYDROCODONE BITARTRATE AND ACETAMINOPHEN 5; 325 MG/1; MG/1
1-2 TABLET ORAL
Status: DISCONTINUED | OUTPATIENT
Start: 2017-06-29 | End: 2017-06-29 | Stop reason: HOSPADM

## 2017-06-29 RX ORDER — SODIUM CHLORIDE, SODIUM LACTATE, POTASSIUM CHLORIDE, CALCIUM CHLORIDE 600; 310; 30; 20 MG/100ML; MG/100ML; MG/100ML; MG/100ML
INJECTION, SOLUTION INTRAVENOUS CONTINUOUS PRN
Status: DISCONTINUED | OUTPATIENT
Start: 2017-06-29 | End: 2017-06-29

## 2017-06-29 RX ORDER — FENTANYL CITRATE 50 UG/ML
INJECTION, SOLUTION INTRAMUSCULAR; INTRAVENOUS PRN
Status: DISCONTINUED | OUTPATIENT
Start: 2017-06-29 | End: 2017-06-29

## 2017-06-29 RX ORDER — ONDANSETRON 2 MG/ML
4 INJECTION INTRAMUSCULAR; INTRAVENOUS EVERY 30 MIN PRN
Status: DISCONTINUED | OUTPATIENT
Start: 2017-06-29 | End: 2017-06-29 | Stop reason: HOSPADM

## 2017-06-29 RX ORDER — CEFAZOLIN SODIUM 1 G/3ML
INJECTION, POWDER, FOR SOLUTION INTRAMUSCULAR; INTRAVENOUS PRN
Status: DISCONTINUED | OUTPATIENT
Start: 2017-06-29 | End: 2017-06-29

## 2017-06-29 RX ORDER — FENTANYL CITRATE 50 UG/ML
25-50 INJECTION, SOLUTION INTRAMUSCULAR; INTRAVENOUS
Status: DISCONTINUED | OUTPATIENT
Start: 2017-06-29 | End: 2017-06-29 | Stop reason: HOSPADM

## 2017-06-29 RX ORDER — ONDANSETRON 2 MG/ML
INJECTION INTRAMUSCULAR; INTRAVENOUS PRN
Status: DISCONTINUED | OUTPATIENT
Start: 2017-06-29 | End: 2017-06-29

## 2017-06-29 RX ORDER — LIDOCAINE HYDROCHLORIDE 10 MG/ML
INJECTION, SOLUTION INFILTRATION; PERINEURAL PRN
Status: DISCONTINUED | OUTPATIENT
Start: 2017-06-29 | End: 2017-06-29 | Stop reason: HOSPADM

## 2017-06-29 RX ORDER — HYDROMORPHONE HYDROCHLORIDE 1 MG/ML
.3-.5 INJECTION, SOLUTION INTRAMUSCULAR; INTRAVENOUS; SUBCUTANEOUS EVERY 10 MIN PRN
Status: DISCONTINUED | OUTPATIENT
Start: 2017-06-29 | End: 2017-06-29 | Stop reason: HOSPADM

## 2017-06-29 RX ORDER — ONDANSETRON 4 MG/1
4 TABLET, ORALLY DISINTEGRATING ORAL EVERY 30 MIN PRN
Status: DISCONTINUED | OUTPATIENT
Start: 2017-06-29 | End: 2017-06-29 | Stop reason: HOSPADM

## 2017-06-29 RX ORDER — DEXAMETHASONE SODIUM PHOSPHATE 4 MG/ML
INJECTION, SOLUTION INTRA-ARTICULAR; INTRALESIONAL; INTRAMUSCULAR; INTRAVENOUS; SOFT TISSUE PRN
Status: DISCONTINUED | OUTPATIENT
Start: 2017-06-29 | End: 2017-06-29

## 2017-06-29 RX ORDER — MEPERIDINE HYDROCHLORIDE 25 MG/ML
12.5 INJECTION INTRAMUSCULAR; INTRAVENOUS; SUBCUTANEOUS
Status: DISCONTINUED | OUTPATIENT
Start: 2017-06-29 | End: 2017-06-29 | Stop reason: HOSPADM

## 2017-06-29 RX ORDER — SODIUM CHLORIDE, SODIUM LACTATE, POTASSIUM CHLORIDE, CALCIUM CHLORIDE 600; 310; 30; 20 MG/100ML; MG/100ML; MG/100ML; MG/100ML
INJECTION, SOLUTION INTRAVENOUS CONTINUOUS
Status: DISCONTINUED | OUTPATIENT
Start: 2017-06-29 | End: 2017-06-29 | Stop reason: HOSPADM

## 2017-06-29 RX ORDER — LIDOCAINE HYDROCHLORIDE 20 MG/ML
INJECTION, SOLUTION INFILTRATION; PERINEURAL PRN
Status: DISCONTINUED | OUTPATIENT
Start: 2017-06-29 | End: 2017-06-29

## 2017-06-29 RX ORDER — PROPOFOL 10 MG/ML
INJECTION, EMULSION INTRAVENOUS CONTINUOUS PRN
Status: DISCONTINUED | OUTPATIENT
Start: 2017-06-29 | End: 2017-06-29

## 2017-06-29 RX ADMIN — LIDOCAINE HYDROCHLORIDE 10 ML: 10 INJECTION, SOLUTION INFILTRATION; PERINEURAL at 14:00

## 2017-06-29 RX ADMIN — HEPARIN SODIUM 250 ML: 1000 INJECTION, SOLUTION INTRAVENOUS; SUBCUTANEOUS at 13:54

## 2017-06-29 RX ADMIN — HEPARIN SODIUM (PORCINE) LOCK FLUSH IV SOLN 100 UNIT/ML 10 ML: 100 SOLUTION at 13:55

## 2017-06-29 RX ADMIN — LIDOCAINE HYDROCHLORIDE 40 MG: 20 INJECTION, SOLUTION INFILTRATION; PERINEURAL at 13:42

## 2017-06-29 RX ADMIN — FENTANYL CITRATE 25 MCG: 50 INJECTION, SOLUTION INTRAMUSCULAR; INTRAVENOUS at 13:52

## 2017-06-29 RX ADMIN — DEXAMETHASONE SODIUM PHOSPHATE 4 MG: 4 INJECTION, SOLUTION INTRA-ARTICULAR; INTRALESIONAL; INTRAMUSCULAR; INTRAVENOUS; SOFT TISSUE at 13:49

## 2017-06-29 RX ADMIN — PROPOFOL 50 MCG/KG/MIN: 10 INJECTION, EMULSION INTRAVENOUS at 13:42

## 2017-06-29 RX ADMIN — ONDANSETRON 4 MG: 2 INJECTION INTRAMUSCULAR; INTRAVENOUS at 13:50

## 2017-06-29 RX ADMIN — MIDAZOLAM HYDROCHLORIDE 2 MG: 1 INJECTION, SOLUTION INTRAMUSCULAR; INTRAVENOUS at 13:41

## 2017-06-29 RX ADMIN — SODIUM CHLORIDE, POTASSIUM CHLORIDE, SODIUM LACTATE AND CALCIUM CHLORIDE: 600; 310; 30; 20 INJECTION, SOLUTION INTRAVENOUS at 13:41

## 2017-06-29 RX ADMIN — CEFAZOLIN 2 G: 1 INJECTION, POWDER, FOR SOLUTION INTRAMUSCULAR; INTRAVENOUS at 13:44

## 2017-06-29 NOTE — BRIEF OP NOTE
[facility] Brief Operative Note    Pre-operative diagnosis: COLON CANCER    Post-operative diagnosis colon cancer     Procedure: Procedure(s):  POWER PORT PLACEMENT  - Wound Class: I-Clean   Surgeon(s): Surgeon(s) and Role:     * Alex Martinez MD - Primary     * Nita Freed PA-C - Assisting   Estimated blood loss: 1 mL    Specimens: * No specimens in log *   Findings: NA

## 2017-06-29 NOTE — IP AVS SNAPSHOT
MRN:[id number]                      After Visit Summary   6/29/2017    Sallie Ceballos    MRN: [id number]           Thank you!     Thank you for choosing [facility] for your care. Our goal is always to provide you with excellent care. Hearing back from our patients is one way we can continue to improve our services. Please take a few minutes to complete the written survey that you may receive in the mail after you visit with us. Thank you!        Patient Information     Date Of Birth          1945        About your hospital stay     You were admitted on:  June 29, 2017 You last received care in the:  [facility] PACU    You were discharged on:  June 29, 2017       Who to Call     For medical emergencies, please call 911.  For non-urgent questions about your medical care, please call your primary care provider or clinic, None  For questions related to your surgery, please call your surgery clinic        Attending Provider     Provider Specialty    Alex Martinez MD Thoracic Diseases       Primary Care Provider    None      After Care Instructions     Diet Instructions       Resume pre-procedure diet            Discharge Instructions       Follow up appointment as instructed by Surgeon and or RN            Do not - immerse incision in water until sutures removed       Do not immerse incision in water/swim for one week.            Dressing       Keep clear Dermabond glue in place.  It will peel off on its own after about 7-10 days.            Shower       No shower for 24 hours post procedure. May shower Postoperative Day (POD)  one                  Further instructions from your care team       Discharge Instructions for Power Port Placement      General Instructions:    You will be given a card with information about your port.  You should carry this with you in your wallet/billfold. It provides information to clinicians about your port.  You should also carry the card in case your  port triggers any security devices.     Activity:    Limit your activity for the first few days after your port is placed    Incision Care:     Unless otherwise directed by your surgeon, the dressing may removed tomorrow.      If a topical skin adhesive was used to close incision, you may shower tomorrow. Do not use soaps, lotions, or ointments on the wound area. Do not scrub the wound. After bathing, pat the wound dry with a soft towel.  Do not scratch, rub, or pick at the strips or film. Do not place tape directly over the strips or film.  Do not apply liquids (such as peroxide), ointments, or creams to the wound while the strips or film are in place.    Call your surgeon if you have:     Redness, swelling or drainage from incision     A fever of 101 F or greater.      Nausea or vomiting.     Pain that is not controlled by medications and/or rest.     Questions or concerns.        Same Day Surgery Discharge Instructions for  Sedation and General Anesthesia       It's not unusual to feel dizzy, light-headed or faint for up to 24 hours after surgery or while taking pain medication.  If you have these symptoms: sit for a few minutes before standing and have someone assist you when you get up to walk or use the bathroom.      You should rest and relax for the next 24 hours. We recommend you make arrangements to have an adult stay with you for at least 24 hours after your discharge.  Avoid hazardous and strenuous activity.      DO NOT DRIVE any vehicle or operate mechanical equipment for 24 hours following the end of your surgery.  Even though you may feel normal, your reactions may be affected by the medication you have received.      Do not drink alcoholic beverages for 24 hours following surgery.       Slowly progress to your regular diet as you feel able. It's not unusual to feel nauseated and/or vomit after receiving anesthesia.  If you develop these symptoms, drink clear liquids (apple juice, ginger ale, broth,  "7-up, etc. ) until you feel better.  If your nausea and vomiting persists for 24 hours, please notify your surgeon.        All narcotic pain medications, along with inactivity and anesthesia, can cause constipation. Drinking plenty of liquids and increasing fiber intake will help.      For any questions of a medical nature, call your surgeon.      Do not make important decisions for 24 hours.      If you had general anesthesia, you may have a sore throat for a couple of days related to the breathing tube used during surgery.  You may use Cepacol lozenges to help with this discomfort.  If it worsens or if you develop a fever, contact your surgeon.       If you feel your pain is not well managed with the pain medications prescribed by your surgeon, please contact your surgeon's office to let them know so they can address your concerns.           Pending Results     Date and Time Order Name Status Description    6/29/2017 1416 XR Chest Port 1 View In process             Admission Information     Date & Time Provider Department Dept. Phone    6/29/2017 Alex Martinez MD [facility] PACU [phone]      Your Vitals Were     Blood Pressure Pulse Temperature Respirations Height Weight    128/86 74 97.5  F (36.4  C) 16 1.575 m (5' 2\") 49.9 kg (110 lb)    Pulse Oximetry BMI (Body Mass Index)                99% 20.12 kg/m2          MyChart Information     [facility] lets you send messages to your doctor, view your test results, renew your prescriptions, schedule appointments and more. To sign up, go to [email or web address] . Click on \"Log in\" on the left side of the screen, which will take you to the Welcome page. Then click on \"Sign up Now\" on the right side of the page.     You will be asked to enter the access code listed below, as well as some personal information. Please follow the directions to create your username and password.     Your access code is: [email or web address]  Expires: 9/3/2017 10:12 AM   "   Your access code will  in 90 days. If you need help or a new code, please call your [facility] clinic or [phone].        Care EveryWhere ID     This is your Care EveryWhere ID. This could be used by other organizations to access your [facility] medical records  [id number]        Equal Access to Services     FRANKIE AWAD : Hadii aad ku hadasho Soomaali, waaxda luqadaha, qaybta kaalmada adeegyada, waxay idiin haybeatristequila cortezlidiaorlando urrutia. So [facility] [phone].    ATENCIÓN: Si habla español, tiene a eagle disposición servicios gratuitos de asistencia lingüística. Nayeli al [phone].    We comply with applicable federal civil rights laws and [place] laws. We do not discriminate on the basis of race, color, national origin, age, disability sex, sexual orientation or gender identity.               Review of your medicines      START taking        Dose / Directions    HYDROcodone-acetaminophen 5-325 MG per tablet   Commonly known as:  NORCO   Used for:  Malignant neoplasm of colon, unspecified part of colon (H)        Dose:  1 tablet   Take 1 tablet by mouth every 6 hours as needed for other (Moderate to Severe Pain)   Quantity:  6 tablet   Refills:  0         CONTINUE these medicines which have NOT CHANGED        Dose / Directions    amLODIPine-benazepril 10-20 MG per capsule   Commonly known as:  LOTREL        Dose:  1 capsule   Take 1 capsule by mouth daily   Refills:  0       COMPAZINE PO        Dose:  10 mg   Take 10 mg by mouth every 6 hours as needed for nausea   Refills:  0       ZOFRAN ODT PO        Dose:  8 mg   Take 8 mg by mouth every 8 hours as needed for nausea   Refills:  0         STOP taking     enoxaparin 40 MG/0.4ML injection   Commonly known as:  LOVENOX                Where to get your medicines      Some of these will need a paper prescription and others can be bought over the counter. Ask your nurse if you have questions.     Bring a paper prescription for each of these medications      HYDROcodone-acetaminophen 5-325 MG per tablet                Protect others around you: Learn how to safely use, store and throw away your medicines at www.disposemymeds.org.             Medication List: This is a list of all your medications and when to take them. Check marks below indicate your daily home schedule. Keep this list as a reference.      Medications           Morning Afternoon Evening Bedtime As Needed    amLODIPine-benazepril 10-20 MG per capsule   Commonly known as:  LOTREL   Take 1 capsule by mouth daily                                COMPAZINE PO   Take 10 mg by mouth every 6 hours as needed for nausea                                HYDROcodone-acetaminophen 5-325 MG per tablet   Commonly known as:  NORCO   Take 1 tablet by mouth every 6 hours as needed for other (Moderate to Severe Pain)                                ZOFRAN ODT PO   Take 8 mg by mouth every 8 hours as needed for nausea

## 2017-06-29 NOTE — OR NURSING
Following significant discussion, Mr. Ceballos's family refused to accept the prescribed Norco pain medication.  Norco was returned to pharmacy for credit.  Patient's daughter stes that they are very afraid of the addictive potential of the medication, and are therefore refusing to accept it.

## 2017-06-29 NOTE — DISCHARGE INSTRUCTIONS
Discharge Instructions for Power Port Placement      General Instructions:    You will be given a card with information about your port.  You should carry this with you in your wallet/billfold. It provides information to clinicians about your port.  You should also carry the card in case your port triggers any security devices.     Activity:    Limit your activity for the first few days after your port is placed    Incision Care:     Unless otherwise directed by your surgeon, the dressing may removed tomorrow.      If a topical skin adhesive was used to close incision, you may shower tomorrow. Do not use soaps, lotions, or ointments on the wound area. Do not scrub the wound. After bathing, pat the wound dry with a soft towel.  Do not scratch, rub, or pick at the strips or film. Do not place tape directly over the strips or film.  Do not apply liquids (such as peroxide), ointments, or creams to the wound while the strips or film are in place.    Call your surgeon if you have:     Redness, swelling or drainage from incision     A fever of 101 F or greater.      Nausea or vomiting.     Pain that is not controlled by medications and/or rest.     Questions or concerns.        Same Day Surgery Discharge Instructions for  Sedation and General Anesthesia       It's not unusual to feel dizzy, light-headed or faint for up to 24 hours after surgery or while taking pain medication.  If you have these symptoms: sit for a few minutes before standing and have someone assist you when you get up to walk or use the bathroom.      You should rest and relax for the next 24 hours. We recommend you make arrangements to have an adult stay with you for at least 24 hours after your discharge.  Avoid hazardous and strenuous activity.      DO NOT DRIVE any vehicle or operate mechanical equipment for 24 hours following the end of your surgery.  Even though you may feel normal, your reactions may be affected by the medication you have  received.      Do not drink alcoholic beverages for 24 hours following surgery.       Slowly progress to your regular diet as you feel able. It's not unusual to feel nauseated and/or vomit after receiving anesthesia.  If you develop these symptoms, drink clear liquids (apple juice, ginger ale, broth, 7-up, etc. ) until you feel better.  If your nausea and vomiting persists for 24 hours, please notify your surgeon.        All narcotic pain medications, along with inactivity and anesthesia, can cause constipation. Drinking plenty of liquids and increasing fiber intake will help.      For any questions of a medical nature, call your surgeon.      Do not make important decisions for 24 hours.      If you had general anesthesia, you may have a sore throat for a couple of days related to the breathing tube used during surgery.  You may use Cepacol lozenges to help with this discomfort.  If it worsens or if you develop a fever, contact your surgeon.       If you feel your pain is not well managed with the pain medications prescribed by your surgeon, please contact your surgeon's office to let them know so they can address your concerns.

## 2017-06-29 NOTE — ANESTHESIA POSTPROCEDURE EVALUATION
Patient: Sallie Ceballos    Procedure(s):  POWER PORT PLACEMENT  - Wound Class: I-Clean    Diagnosis:COLON CANCER   Diagnosis Additional Information: No value filed.    Anesthesia Type:  MAC    Note:  Anesthesia Post Evaluation    Patient location during evaluation: PACU  Patient participation: Able to fully participate in evaluation  Level of consciousness: awake  Pain management: adequate  Airway patency: patent  Cardiovascular status: acceptable  Respiratory status: acceptable  Hydration status: acceptable  PONV: none     Anesthetic complications: None          Last vitals:  Vitals:    06/29/17 1430 06/29/17 1445 06/29/17 1500   BP: 124/89 128/86 128/85   Pulse:      Resp: 14 16 14   Temp: 36.2  C (97.2  F) 36.4  C (97.5  F) 36.3  C (97.3  F)   SpO2: 99% 99% 100%         Electronically Signed By: Sheila Matta  June 29, 2017  3:11 PM

## 2017-06-29 NOTE — IP AVS SNAPSHOT
[facility] [address]    [place] [address]    Phone:  [phone]                                       After Visit Summary   6/29/2017    Sallie Ceballos    MRN: [id number]           After Visit Summary Signature Page     I have received my discharge instructions, and my questions have been answered. I have discussed any challenges I see with this plan with the nurse or doctor.    ..........................................................................................................................................  Patient/Patient Representative Signature      ..........................................................................................................................................  Patient Representative Print Name and Relationship to Patient    ..................................................               ................................................  Date                                            Time    ..........................................................................................................................................  Reviewed by Signature/Title    ...................................................              ..............................................  Date                                                            Time

## 2017-06-29 NOTE — ANESTHESIA CARE TRANSFER NOTE
Patient: Sallie Ceballos    Procedure(s):  POWER PORT PLACEMENT  - Wound Class: I-Clean    Diagnosis: COLON CANCER   Diagnosis Additional Information: No value filed.    Anesthesia Type:   MAC     Note:  Airway :Face Mask  Patient transferred to:PACU  Comments: At end of procedure, spontaneous respirations, patient alert to voice, able to follow commands. Oxygen via facemask at 10 liters per minute to PACU. Oxygen tubing connected to wall O2 in PACU, SpO2, NiBP, and EKG monitors and alarms on and functioning, Marixa Hugger warmer connected to patient gown, report on patient's clinical status given to PACU RN , RN questions answered.      Vitals: (Last set prior to Anesthesia Care Transfer)    CRNA VITALS  6/29/2017 1340 - 6/29/2017 1415      6/29/2017             Pulse: 77    SpO2: 100 %    Resp Rate (set): 10                Electronically Signed By: DAVID Valentine CRNA  June 29, 2017  2:15 PM

## 2017-07-04 NOTE — OP NOTE
DATE OF PROCEDURE:  June 29,, 2017      SURGEON:  Alex Martinez MD  FIRST ASSISTANT: Nita Freed PA-C      PREOPERATIVE DIAGNOSIS:  colon cancer      POSTOPERATIVE DIAGNOSIS:  Same       PROCEDURE:  Placement of [place] PowerPort implantable port, left subclavian vein.       ANESTHESIA:  Local with lidocaine 1% without epinephrine and sedation.       INDICATIONS:  Patient will undergo chemotherapy and a PowerPort is indicated for venous access.       DESCRIPTION OF PROCEDURE:  The patient was brought to the OR and placed in supine position.  The patient was placed into Trendelenburg.  IV sedation was given.  The neck and upper chest were prepared and draped in the usual fashion using ChloraPrep.  Local anesthesia was performed with lidocaine 1% without epinephrine. The left subclavian vein was punctured easily with a 16-gauge needle on the first attempt.  There was excellent blood return.  A guidewire was advanced through the needle without any resistance.  The needle was removed.  A transverse incision was made along the guidewire.  Subcutaneous pocket was made inferior to the incision.  Hemostasis was verified and was excellent.  An introducer with a peel-away sheath was introduced into the vein over the guidewire.  Guidewire and introducer were then removed.  The catheter was advanced through the peel-away sheath without resistance. The peel-away sheath was removed.  The catheter was placed at 20 cm at the skin level.  The catheter was cut and connected to the port.  The port was placed in the subcutaneous pocket.  The port was accessed with a non-coring needle.  There was excellent blood return, PowerPort was finally flushed with 10 mL of solution of heparin flush.  The incision was closed in the usual fashion.  A chest x-ray performed in the operating room and showed the catheter was in excellent position and the PowerPort is ready to be used.           ALEX MARTINEZ MD

## 2018-07-09 ENCOUNTER — SURGERY (OUTPATIENT)
Age: 73
End: 2018-07-09

## 2018-07-09 ENCOUNTER — HOSPITAL ENCOUNTER (OUTPATIENT)
Facility: CLINIC | Age: 73
Discharge: HOME | End: 2018-07-09
Attending: COLON & RECTAL SURGERY | Admitting: COLON & RECTAL SURGERY
Payer: MEDICARE

## 2018-07-09 VITALS
OXYGEN SATURATION: 97 % | SYSTOLIC BLOOD PRESSURE: 171 MMHG | RESPIRATION RATE: 13 BRPM | WEIGHT: 118 LBS | BODY MASS INDEX: 21.71 KG/M2 | HEIGHT: 62 IN | DIASTOLIC BLOOD PRESSURE: 106 MMHG

## 2018-07-09 LAB — COLONOSCOPY: NORMAL

## 2018-07-09 PROCEDURE — 88305 TISSUE EXAM BY PATHOLOGIST: CPT | Performed by: COLON & RECTAL SURGERY

## 2018-07-09 PROCEDURE — 88305 TISSUE EXAM BY PATHOLOGIST: CPT | Mod: 26 | Performed by: COLON & RECTAL SURGERY

## 2018-07-09 PROCEDURE — 45385 COLONOSCOPY W/LESION REMOVAL: CPT | Mod: PT | Performed by: COLON & RECTAL SURGERY

## 2018-07-09 PROCEDURE — G0500 MOD SEDAT ENDO SERVICE >5YRS: HCPCS | Performed by: COLON & RECTAL SURGERY

## 2018-07-09 PROCEDURE — 25000128 H RX IP 250 OP 636: Performed by: COLON & RECTAL SURGERY

## 2018-07-09 RX ORDER — LIDOCAINE 40 MG/G
CREAM TOPICAL
Status: DISCONTINUED | OUTPATIENT
Start: 2018-07-09 | End: 2018-07-09 | Stop reason: HOSPADM

## 2018-07-09 RX ORDER — FENTANYL CITRATE 50 UG/ML
INJECTION, SOLUTION INTRAMUSCULAR; INTRAVENOUS PRN
Status: DISCONTINUED | OUTPATIENT
Start: 2018-07-09 | End: 2018-07-09 | Stop reason: HOSPADM

## 2018-07-09 RX ORDER — ONDANSETRON 2 MG/ML
4 INJECTION INTRAMUSCULAR; INTRAVENOUS
Status: DISCONTINUED | OUTPATIENT
Start: 2018-07-09 | End: 2018-07-09 | Stop reason: HOSPADM

## 2018-07-09 RX ADMIN — MIDAZOLAM 1 MG: 1 INJECTION INTRAMUSCULAR; INTRAVENOUS at 09:34

## 2018-07-09 RX ADMIN — FENTANYL CITRATE 100 MCG: 50 INJECTION, SOLUTION INTRAMUSCULAR; INTRAVENOUS at 09:34

## 2018-07-09 NOTE — H&P
Pre-Endoscopy History and Physical     Sallie Ceballos MRN# [id number]   YOB: 1945 Age: 72 year old     Date of Procedure: 7/9/2018  Primary care provider: [facility] Isidro  Type of Endoscopy: colonoscopy  Reason for Procedure: surveillance h/o colon cancer, T3, N1  Type of Anesthesia Anticipated: Moderate Sedation    HPI:    Sallie is a 72 year old male who will be undergoing the above procedure.      A history and physical has been performed. The patient's medications and allergies have been reviewed. The risks and benefits of the procedure including the risk of bleeding, perforation, and missed lesions as well as the sedation options and risks were discussed with the patient.  All questions were answered and informed consent was obtained.      No Known Allergies     Current Facility-Administered Medications   Medication     lidocaine (LMX4) cream     lidocaine 1 % 1 mL     ondansetron (ZOFRAN) injection 4 mg     sodium chloride (PF) 0.9% PF flush 3 mL     sodium chloride (PF) 0.9% PF flush 3 mL       Prescriptions Prior to Admission   Medication Sig Dispense Refill Last Dose     amLODIPine-benazepril (LOTREL) 10-20 MG per capsule Take 1 capsule by mouth daily   Unknown     HYDROcodone-acetaminophen (NORCO) 5-325 MG per tablet Take 1 tablet by mouth every 6 hours as needed for other (Moderate to Severe Pain) 6 tablet 0 Unknown     Ondansetron (ZOFRAN ODT PO) Take 8 mg by mouth every 8 hours as needed for nausea   Unknown     Prochlorperazine Maleate (COMPAZINE PO) Take 10 mg by mouth every 6 hours as needed for nausea   Unknown       Patient Active Problem List   Diagnosis     Hypertensive urgency     Dizziness     Colon cancer (H)        Past Medical History:   Diagnosis Date     BPPV (benign paroxysmal positional vertigo)      Colon cancer (H)      FH: colon cancer      Heart disease      HLD (hyperlipidemia)      Hypertension      Prediabetes         Past Surgical History:   Procedure Laterality Date  "    COLONOSCOPY N/A 4/24/2017    Procedure: COMBINED COLONOSCOPY, SINGLE OR MULTIPLE BIOPSY/POLYPECTOMY BY BIOPSY;  colonoscopy;  Surgeon: Estrella Epstein MD;  Location:  GI     COLONOSCOPY N/A 6/1/2017    Procedure: COLONOSCOPY;  Tattoo submucosal injection ;  Surgeon: Darwin Lowry MD;  Location:  OR     INSERT PORT VASCULAR ACCESS N/A 6/29/2017    Procedure: INSERT PORT VASCULAR ACCESS;  POWER PORT PLACEMENT ;  Surgeon: Alex Martinez MD;  Location:  OR     LAPAROSCOPIC ASSISTED SIGMOID COLECTOMY N/A 6/1/2017    Procedure: LAPAROSCOPIC ASSISTED SIGMOID COLECTOMY;  INTRAOP COLONOSCOPY WITH TATTOOING/ LAPAROSCOPIC ASSISTED LEFT COLECTOMY ;  Surgeon: Darwin Lowry MD;  Location:  OR       Social History   Substance Use Topics     Smoking status: Never Smoker     Smokeless tobacco: Never Used     Alcohol use No       History reviewed. No pertinent family history.      PHYSICAL EXAM:   BP (!) 174/100  Resp 16  Ht 1.575 m (5' 2\")  Wt 53.5 kg (118 lb)  SpO2 97%  BMI 21.58 kg/m2 Estimated body mass index is 21.58 kg/(m^2) as calculated from the following:    Height as of this encounter: 1.575 m (5' 2\").    Weight as of this encounter: 53.5 kg (118 lb).   Mental status - alert and oriented  RESP: lungs clear  CV: RRR  AIRWAY EXAM: Mallampatti Class II (visualization of the soft palate, fauces, and uvula)    IMPRESSION   ASA Class 2 - Mild systemic disease      Signed Electronically by: Darwin Lowry  July 9, 2018    Colorectal Surgery  [phone] (office)  [phone] (pager)  [email or web address]        "

## 2018-07-10 LAB — COPATH REPORT: NORMAL

## 2018-07-12 NOTE — OR NURSING
Pt and daughter informed of cancellation. Pt to stop Lovenox after Wednesday am dose.  Both agree to plan.     Spoke to patient, she has been having severe diarrhea. She has been having loose watery stoolx4 days and has had 10-20 BM's daily. She is on her max dose of Imodium and its not helping, please advise

## 2018-07-13 ENCOUNTER — OFFICE VISIT (OUTPATIENT)
Dept: FAMILY MEDICINE | Facility: CLINIC | Age: 73
End: 2018-07-13
Payer: COMMERCIAL

## 2018-07-13 VITALS
OXYGEN SATURATION: 96 % | HEIGHT: 62 IN | BODY MASS INDEX: 21.71 KG/M2 | WEIGHT: 118 LBS | DIASTOLIC BLOOD PRESSURE: 74 MMHG | HEART RATE: 75 BPM | TEMPERATURE: 97.5 F | SYSTOLIC BLOOD PRESSURE: 114 MMHG

## 2018-07-13 DIAGNOSIS — C18.9 MALIGNANT NEOPLASM OF COLON, UNSPECIFIED PART OF COLON (H): ICD-10-CM

## 2018-07-13 DIAGNOSIS — I10 ESSENTIAL HYPERTENSION: Primary | ICD-10-CM

## 2018-07-13 DIAGNOSIS — Z76.89 ESTABLISHING CARE WITH NEW DOCTOR, ENCOUNTER FOR: ICD-10-CM

## 2018-07-13 PROCEDURE — 99214 OFFICE O/P EST MOD 30 MIN: CPT | Performed by: INTERNAL MEDICINE

## 2018-07-13 RX ORDER — AMLODIPINE AND BENAZEPRIL HYDROCHLORIDE 10; 20 MG/1; MG/1
1 CAPSULE ORAL DAILY
Qty: 90 CAPSULE | Refills: 3 | Status: SHIPPED | OUTPATIENT
Start: 2018-07-13

## 2018-07-13 RX ORDER — AMLODIPINE AND BENAZEPRIL HYDROCHLORIDE 10; 20 MG/1; MG/1
1 CAPSULE ORAL DAILY
Qty: 90 CAPSULE | Refills: 3 | Status: SHIPPED | OUTPATIENT
Start: 2018-07-13 | End: 2018-07-13

## 2018-07-13 NOTE — MR AVS SNAPSHOT
After Visit Summary   7/13/2018    Sallie Ceballos    MRN: [id number]           Patient Information     Date Of Birth          1945        Visit Information        Provider Department      7/13/2018 9:20 AM Vernell Shahid MD [facility]        Today's Diagnoses     Essential hypertension    -  1    Establishing care with new doctor, encounter for        Malignant neoplasm of colon, unspecified part of colon (H)           Follow-ups after your visit        Your next 10 appointments already scheduled     Aug 06, 2018 10:00 AM CDT   (Arrive by 8:30 AM)   IR PORT REMOVAL LEFT with SHIR1   [facility] Interventional Radiology ([facility])    [address] [address]   [phone]           1. Your doctor will need to do a history and physical within 7 days before this procedure. 2. Your doctor decide will which medications should not be taken the morning of the exam. 3. Laboratory tests are to be obtained by your doctor prior to the exam (Basic Metabolic Panel, CBCP, PTT and INR) (No labs needed if you are having a tunneled catheter exchange or removal) 4. If you have allergies to x-ray contrast or iodine, contact your doctor or a Radiology nurse prior to the exam day for instructions. 5. Someone will need to drive you to and from the hospital. 6. If you are or may be pregnant, contact your doctor or a Radiology nurse prior to the day of the exam. 7. If you have diabetes, check with your doctor or a Radiology nurse to see if your insulin needs to be adjusted for the exam. 8. If you are taking a medication called Glucophage or Glucovance; these medications need to be held the day of the exam and for approximately 48 hours following. A blood sample must be drawn so your creatinine level can be checked before resuming this medication. 9. If you are taking Coumadin (to thin you blood) please contact your doctor or a Radiology nurse at least 3 days  before the exam for special instructions. 10. You should not have received contrast within 48 hours of this exam. 11. The day before your exam you may eat your regular diet and are encouraged to drink at least 2 quarts of clear liquids. Drink no alcoholic beverages for 24 hours prior to the exam. 12. If you have a colostomy you will need to irrigate it with tap water at 8PM the evening before and again at 6AM the morning of the exam. 13. Do not smoke for 24 hours prior to the procedure. 14. Birth to 4 years: - Breast feeding must be stopped 4 hours prior to exam - Solid food or formula must be stopped 6 hours prior to exam - Tube feedings must be stopped 6 hours prior to exam 15. 4-10 years old: - Nothing to eat or drink 6 hours prior to exam 16. 10+ years old: - Nothing to eat or drink 8 hours prior to exam 17. The morning of the exam you may brush your teeth and take medications as directed with a sip of water. 18. When discharged, you cannot drive until morning, and an adult must be with you until then. You should stay in the [facility] overnight. 19. Bring a list of all drugs you are taking; include supplements and over-the-counter medications. Wear comfortable clothes and leave your valuables at home.              Who to contact     If you have questions or need follow up information about today's clinic visit or your schedule please contact [facility] directly at [phone].  Normal or non-critical lab and imaging results will be communicated to you by MyChart, letter or phone within 4 business days after the clinic has received the results. If you do not hear from us within 7 days, please contact the clinic through [employer] or phone. If you have a critical or abnormal lab result, we will notify you by phone as soon as possible.  Submit refill requests through [employer] or call your pharmacy and they will forward the refill request to us. Please allow 3 business days for your refill to be  "completed.          Additional Information About Your Visit        Care EveryWhere ID     This is your Care EveryWhere ID. This could be used by other organizations to access your [facility] medical records  [id number]        Your Vitals Were     Pulse Temperature Height Pulse Oximetry BMI (Body Mass Index)       75 97.5  F (36.4  C) (Oral) 5' 2\" (1.575 m) 96% 21.58 kg/m2        Blood Pressure from Last 3 Encounters:   07/13/18 114/74   07/09/18 (!) 171/106   06/29/17 128/85    Weight from Last 3 Encounters:   07/13/18 118 lb (53.5 kg)   07/09/18 118 lb (53.5 kg)   06/29/17 110 lb (49.9 kg)              Today, you had the following     No orders found for display         Today's Medication Changes          These changes are accurate as of 7/13/18 11:59 PM.  If you have any questions, ask your nurse or doctor.               Start taking these medicines.        Dose/Directions    amLODIPine-benazepril 10-20 MG per capsule   Commonly known as:  LOTREL   Used for:  Essential hypertension   Started by:  Vernell Shahid MD        Dose:  1 capsule   Take 1 capsule by mouth daily   Quantity:  90 capsule   Refills:  3            Where to get your medicines      Some of these will need a paper prescription and others can be bought over the counter.  Ask your nurse if you have questions.     Bring a paper prescription for each of these medications     amLODIPine-benazepril 10-20 MG per capsule                Primary Care Provider Office Phone # Fax #    Vernell Shahid [phone] [address] [address]        Equal Access to Services     [facility]: Hadii aad ku hadasho Soomaali, waaxda luqadaha, qaybta kaalmada elizabeth, avinash urrutia. So [facility] [phone].    ATENCIÓN: Si habla español, tiene a eagle disposición servicios gratuitos de asistencia lingüística. Llame al [phone].    We comply with applicable federal civil rights laws and [place] " laws. We do not discriminate on the basis of race, color, national origin, age, disability, sex, sexual orientation, or gender identity.            Thank you!     Thank you for choosing [facility]  for your care. Our goal is always to provide you with excellent care. Hearing back from our patients is one way we can continue to improve our services. Please take a few minutes to complete the written survey that you may receive in the mail after your visit with us. Thank you!             Your Updated Medication List - Protect others around you: Learn how to safely use, store and throw away your medicines at www.disposemymeds.org.          This list is accurate as of 7/13/18 11:59 PM.  Always use your most recent med list.                   Brand Name Dispense Instructions for use Diagnosis    amLODIPine-benazepril 10-20 MG per capsule    LOTREL    90 capsule    Take 1 capsule by mouth daily    Essential hypertension       COMPAZINE PO      Take 10 mg by mouth every 6 hours as needed for nausea        HYDROcodone-acetaminophen 5-325 MG per tablet    NORCO    6 tablet    Take 1 tablet by mouth every 6 hours as needed for other (Moderate to Severe Pain)    Malignant neoplasm of colon, unspecified part of colon (H)       ZOFRAN ODT PO      Take 8 mg by mouth every 8 hours as needed for nausea

## 2018-07-13 NOTE — PROGRESS NOTES
SUBJECTIVE:   Sallie Ceballos is a 72 year old male who presents to clinic today for the following health issues:      New Patient/Transfer of Care  Hypertension Follow-up      Outpatient blood pressures are not being checked.    Low Salt Diet: no added salt    Amount of exercise or physical activity: None    Problems taking medications regularly: No    Medication side effects: none    Diet: regular (no restrictions)        Current Medications:     Current Outpatient Prescriptions   Medication Sig Dispense Refill     amLODIPine-benazepril (LOTREL) 10-20 MG per capsule Take 1 capsule by mouth daily 90 capsule 3     HYDROcodone-acetaminophen (NORCO) 5-325 MG per tablet Take 1 tablet by mouth every 6 hours as needed for other (Moderate to Severe Pain) 6 tablet 0     Ondansetron (ZOFRAN ODT PO) Take 8 mg by mouth every 8 hours as needed for nausea       Prochlorperazine Maleate (COMPAZINE PO) Take 10 mg by mouth every 6 hours as needed for nausea           Allergies:    No Known Allergies         Past Medical History:     Past Medical History:   Diagnosis Date     BPPV (benign paroxysmal positional vertigo)      Colon cancer (H)      FH: colon cancer      Heart disease      HLD (hyperlipidemia)      Hypertension      Prediabetes          Past Surgical History:     Past Surgical History:   Procedure Laterality Date     COLONOSCOPY N/A 4/24/2017    Procedure: COMBINED COLONOSCOPY, SINGLE OR MULTIPLE BIOPSY/POLYPECTOMY BY BIOPSY;  colonoscopy;  Surgeon: Estrella Epstein MD;  Location:  GI     COLONOSCOPY N/A 6/1/2017    Procedure: COLONOSCOPY;  Tattoo submucosal injection ;  Surgeon: Darwin Lowry MD;  Location:  OR     INSERT PORT VASCULAR ACCESS N/A 6/29/2017    Procedure: INSERT PORT VASCULAR ACCESS;  POWER PORT PLACEMENT ;  Surgeon: Alex Martinez MD;  Location:  OR     LAPAROSCOPIC ASSISTED SIGMOID COLECTOMY N/A 6/1/2017    Procedure: LAPAROSCOPIC ASSISTED SIGMOID COLECTOMY;  INTRAOP COLONOSCOPY  "WITH TATTOOING/ LAPAROSCOPIC ASSISTED LEFT COLECTOMY ;  Surgeon: Darwin Lowry MD;  Location:  OR         Family Medical History:   No family history on file.      Social History:     Social History     Social History     Marital status:      Spouse name: N/A     Number of children: N/A     Years of education: N/A     Occupational History     Not on file.     Social History Main Topics     Smoking status: Never Smoker     Smokeless tobacco: Never Used     Alcohol use No     Drug use: No     Sexual activity: Not on file     Other Topics Concern     Not on file     Social History Narrative           Review of System:     Constitutional: Negative for fever or chills  Skin: Negative for rashes  Ears/Nose/Throat: Negative for nasal congestion, sore throat  Respiratory: No shortness of breath, dyspnea on exertion, cough, or hemoptysis  Cardiovascular: Negative for chest pain  Gastrointestinal: Negative for nausea, vomiting  Genitourinary: Negative for dysuria, hematuria  Musculoskeletal: Negative for myalgias  Neurologic: Negative for headaches  Psychiatric: Negative for depression, anxiety  Hematologic/Lymphatic/Immunologic: Negative  Endocrine: Negative  Behavioral: Negative for tobacco use       Physical Exam:   /74 (BP Location: Left arm, Patient Position: Sitting, Cuff Size: Adult Regular)  Pulse 75  Temp 97.5  F (36.4  C) (Oral)  Ht 5' 2\" (1.575 m)  Wt 118 lb (53.5 kg)  SpO2 96%  BMI 21.58 kg/m2    GENERAL: healthy, alert and no distress  EYES: eyes grossly normal to inspection, and conjunctivae and sclerae normal  HENT: Normocephalic atraumatic. Nose and mouth without ulcers or lesions  NECK: supple  RESP: lungs clear to auscultation   CV: regular rate and rhythm, normal S1 S2  LYMPH: no peripheral edema   ABDOMEN: nondistended  MS: no gross musculoskeletal defects noted  SKIN: no suspicious lesions or rashes  NEURO: Alert & Oriented x 3.   PSYCH: mentation appears normal, affect " normal        Diagnostic Test Results:     Diagnostic Test Results:  Results for orders placed or performed during the hospital encounter of 07/09/18   COLONOSCOPY   Result Value Ref Range    COLONOSCOPY       [facility] Department  _______________________________________________________________________________  Patient Name: Sallie Ceballos                Procedure Date: 7/9/2018 9:23 AM  MRN: [id number]                       Account Number: [id number]  YOB: 1945             Admit Type: Outpatient  Age: 72                               Room: 1  Note Status: Finalized                Attending MD: Darwin Lowry MD  Total Sedation Time:                  Instrument Name: 325 [id number]   AdultColonoscope  _______________________________________________________________________________     Procedure:                Colonoscopy  Indications:              High risk colon cancer surveillance: Personal                             history of colon cancer - s/p left colectomy 1 year                             ago. T3 N1.  Providers:                Darwin Lowry MD, Lee Ann Camacho, RN  Referring MD:             Ralph Cortes  Medicines:                M idazolam 1 mg IV, Fentanyl 100 micrograms IV. The                             MD provided 19 minutes of 1:1 continuous bedside                             monitoring. Abdominal exam demonstrates abnormal                             rash around incision.  Complications:            No immediate complications.  _______________________________________________________________________________  Procedure:                Pre-Anesthesia Assessment:                            - Prior to the procedure, a History and Physical                             was performed, and patient medications and                             allergies were reviewed. The patient is competent.                             The risks and benefits of the procedure  and the                             sedation options and risks were discussed with the                             patient. All questions were answered and informed                             consent was obtained. Patient identification and                              proposed procedure were verified by the physician                             in the endoscopy suite. Mental Status Examination:                             alert and oriented. Airway Examination: normal                             oropharyngeal airway and neck mobility. Respiratory                             Examination: clear to auscultation. CV Examination:                             normal. Prophylactic Antibiotics: The patient does                             not require prophylactic antibiotics. Prior                             Anticoagulants: The patient has taken no previous                             anticoagulant or antiplatelet agents. ASA Grade                             Assessment: II - A patient with mild systemic                             disease. After reviewing the risks and benefits,                             the patient was deemed in satisfactory condition to                             undergo the procedure. The anesthesia plan was to                              use moderate sedation / analgesia (conscious                             sedation). Immediately prior to administration of                             medications, the patient was re-assessed for                             adequacy to receive sedatives. The heart rate,                             respiratory rate, oxygen saturations, blood                             pressure, adequacy of pulmonary ventilation, and                             response to care were monitored throughout the                             procedure. The physical status of the patient was                             re-assessed after the procedure.                            After  obtaining informed consent, the colonoscope                             was passed under direct vision. Throughout the                             procedure, the patient's blood pressure, pulse, and                             oxygen saturations were monitored continuously. The                             Colonoscope  was introduced through the anus and                             advanced to the cecum, identified by appendiceal                             orifice and ileocecal valve. The colonoscopy was                             performed without difficulty. The patient tolerated                             the procedure well. The quality of the bowel                             preparation was excellent and adequate to identify                             polyps.                                                                                   Findings:       The perianal and digital rectal examinations were normal. Pertinent        negatives include normal sphincter tone and no palpable rectal lesions.       A 5 mm polyp was found in the transverse colon. The polyp was        pedunculated. The polyp was removed with a cold snare. Resection and        retrieval were complete.       There was evidence of a prior end-to-end colo-colonic anastomosis in the        descending colon. This was patent a nd was characterized by healthy        appearing mucosa. The anastomosis was traversed.       A few medium-mouthed diverticula were found in the sigmoid colon. There        was no evidence of diverticular bleeding.       The exam was otherwise without abnormality on direct and retroflexion        views.                                                                                   Impression:               - One 5 mm polyp in the transverse colon, removed                             with a cold snare. Resected and retrieved.                            - Patent end-to-end colo-colonic anastomosis,                              characterized by healthy appearing mucosa.                            - Diverticulosis in the sigmoid colon. There was no                             evidence of diverticular bleeding.                            - The examination was otherwise normal on direct                             and retroflexion views.  Recommendation:           - Discharge patient to [facility].                            - Await pathology results.                            - Repeat colonoscopy in 3 years for surveillance.                            - Return to primary care physician at the next                             available appointment for management of                             hypertension.                            - Return to my office at appointment to be                             scheduled for biopsy of abdominal skin lesion.                                                                                   Procedure Code(s):       --- Professional ---       02181, Colonoscopy, flexible; with removal of tumor(s), polyp(s), or        other lesion(s) by snare technique  Diagnosis Code(s):       --- Professional ---       Z85.038, Personal history of other malignant neoplasm of large intestine       D12.3, Benign neoplasm of transverse colon (hepatic flexure or splenic        flexure)       Z98.0, Intestinal bypass and anastomosis status       K57.30, Dive rticulosis of large intestine without perforation or abscess        without bleeding    CPT copyright 2017 American Medical Association. All rights reserved.    The codes documented in this report are preliminary and upon  review may   be revised to meet current compliance requirements.      ___________________  Darwin Lowry MD  7/9/2018 9:59:31 AM  I was physically present for the entire viewing portion of the exam.  Darwin Lowry MD  Number of Addenda: 0    Note Initiated On: 7/9/2018 9:23 AM  MRN:                      [id number]  Procedure Date:            "7/9/2018 9:23:08 AM  Scope Withdrawal Time: 0 hours 9 minutes 26 seconds   Total Procedure Duration: 0 hours 17 minutes 39 seconds   Estimated Blood Loss:       Scope In: 9:34:26 AM  Scope Out: 9:52:05 AM     Surgical pathology exam   Result Value Ref Range    Copath Report       Patient Name: LEI MIRAMONTES  MR#: [id number]  Specimen #: [id number]  Collected: 7/9/2018  Received: 7/9/2018  Reported: 7/10/2018 10:04  Ordering Phy(s): JASON BERMAN    For improved result formatting, select 'View Enhanced Report Format' under   Linked Documents section.    SPECIMEN(S):  Colon polyp, transverse    FINAL DIAGNOSIS:  Transverse colon, polyp, polypectomy-  -Tubular adenoma. Negative for high grade dysplasia or malignancy.    Electronically signed out by:    Julia Hobson M.D.    CLINICAL HISTORY:   History of colon cancer    GROSS:  The specimen is received in formalin with proper patient identification   labeled \"transverse polyp\".  The  specimen consists of tan dome-shaped polyp measuring up to 0.3 cm.  The   specimen is entirely submitted in one  cassette. (Dictated by: Santy Chaidez 7/9/2018 12:15 PM)    MICROSCOPIC:  A formal microscopic exam is performed.    CPT Codes:  A: 64099-LK3    TESTING LAB LOCATION:  [address]  [address] [phone]    COLLECTION SITE:  Client: [facility]  Location: [facility] ([facility])         ASSESSMENT/PLAN:     (Z76.89) Establishing care with new doctor, encounter for  (I10) Essential hypertension  (primary encounter diagnosis)  Comment: patient presents to clinic today to establish care and for refill of his BP medication with Lotrel. His BP is currently well controlled for his age.  Plan: I have ordered refill of amLODIPine-benazepril (LOTREL) 10-20 MG per capsule HTN medication today.      (C18.9) Malignant neoplasm of colon, unspecified part of colon (H)  Comment: colon cancer s/p previous cancer surgery treatment. " Patient is currently followed by GI clinic on a routine basis.  Plan: continue routine GI clinic follow up going forward.      Follow Up Plan:     Patient is instructed to return to Internal Medicine clinic for follow-up visit in 3 months.        Vernell Shahid MD  Internal Medicine  [facility]\

## 2018-08-06 ENCOUNTER — APPOINTMENT (OUTPATIENT)
Dept: INTERVENTIONAL RADIOLOGY/VASCULAR | Facility: CLINIC | Age: 73
End: 2018-08-06
Attending: INTERNAL MEDICINE
Payer: MEDICARE

## 2018-08-06 ENCOUNTER — HOSPITAL ENCOUNTER (OUTPATIENT)
Facility: CLINIC | Age: 73
Discharge: HOME | End: 2018-08-06
Attending: RADIOLOGY | Admitting: RADIOLOGY
Payer: MEDICARE

## 2018-08-06 VITALS
WEIGHT: 118 LBS | DIASTOLIC BLOOD PRESSURE: 73 MMHG | SYSTOLIC BLOOD PRESSURE: 124 MMHG | HEIGHT: 62 IN | HEART RATE: 68 BPM | TEMPERATURE: 97.8 F | RESPIRATION RATE: 14 BRPM | BODY MASS INDEX: 21.71 KG/M2 | OXYGEN SATURATION: 99 %

## 2018-08-06 DIAGNOSIS — Z51.89 TREATMENT: ICD-10-CM

## 2018-08-06 LAB
APTT PPP: 35 SEC (ref 22–37)
ERYTHROCYTE [DISTWIDTH] IN BLOOD BY AUTOMATED COUNT: 13.3 % (ref 10–15)
HCT VFR BLD AUTO: 40.1 % (ref 40–53)
HGB BLD-MCNC: 13.2 G/DL (ref 13.3–17.7)
INR PPP: 0.97 (ref 0.86–1.14)
MCH RBC QN AUTO: 30.1 PG (ref 26.5–33)
MCHC RBC AUTO-ENTMCNC: 32.9 G/DL (ref 31.5–36.5)
MCV RBC AUTO: 92 FL (ref 78–100)
PLATELET # BLD AUTO: 200 10E9/L (ref 150–450)
RBC # BLD AUTO: 4.38 10E12/L (ref 4.4–5.9)
WBC # BLD AUTO: 7.6 10E9/L (ref 4–11)

## 2018-08-06 PROCEDURE — 85027 COMPLETE CBC AUTOMATED: CPT | Performed by: RADIOLOGY

## 2018-08-06 PROCEDURE — 25000128 H RX IP 250 OP 636: Performed by: RADIOLOGY

## 2018-08-06 PROCEDURE — 27211193 ZZ H WOUND GLUE CR1

## 2018-08-06 PROCEDURE — 85730 THROMBOPLASTIN TIME PARTIAL: CPT | Performed by: RADIOLOGY

## 2018-08-06 PROCEDURE — 40000854 ZZH STATISTIC SIMPLE TUBE INSERTION/CHARGE, PORT, CATH, FISTULOGRAM

## 2018-08-06 PROCEDURE — 25000125 ZZHC RX 250

## 2018-08-06 PROCEDURE — 36415 COLL VENOUS BLD VENIPUNCTURE: CPT | Performed by: RADIOLOGY

## 2018-08-06 PROCEDURE — 25000128 H RX IP 250 OP 636

## 2018-08-06 PROCEDURE — 36590 REMOVAL TUNNELED CV CATH: CPT

## 2018-08-06 PROCEDURE — 85610 PROTHROMBIN TIME: CPT | Performed by: RADIOLOGY

## 2018-08-06 PROCEDURE — 27210905 ZZH KIT CR7

## 2018-08-06 RX ORDER — NALOXONE HYDROCHLORIDE 0.4 MG/ML
.1-.4 INJECTION, SOLUTION INTRAMUSCULAR; INTRAVENOUS; SUBCUTANEOUS
Status: DISCONTINUED | OUTPATIENT
Start: 2018-08-06 | End: 2018-08-06 | Stop reason: HOSPADM

## 2018-08-06 RX ORDER — ACETAMINOPHEN 325 MG/1
650-975 TABLET ORAL
Status: DISCONTINUED | OUTPATIENT
Start: 2018-08-06 | End: 2018-08-06 | Stop reason: HOSPADM

## 2018-08-06 RX ORDER — FENTANYL CITRATE 50 UG/ML
25-50 INJECTION, SOLUTION INTRAMUSCULAR; INTRAVENOUS EVERY 5 MIN PRN
Status: DISCONTINUED | OUTPATIENT
Start: 2018-08-06 | End: 2018-08-06 | Stop reason: HOSPADM

## 2018-08-06 RX ORDER — LIDOCAINE 40 MG/G
CREAM TOPICAL
Status: DISCONTINUED | OUTPATIENT
Start: 2018-08-06 | End: 2018-08-06 | Stop reason: HOSPADM

## 2018-08-06 RX ORDER — NICOTINE POLACRILEX 4 MG
15-30 LOZENGE BUCCAL
Status: DISCONTINUED | OUTPATIENT
Start: 2018-08-06 | End: 2018-08-06 | Stop reason: HOSPADM

## 2018-08-06 RX ORDER — LIDOCAINE HYDROCHLORIDE AND EPINEPHRINE 10; 10 MG/ML; UG/ML
1-30 INJECTION, SOLUTION INFILTRATION; PERINEURAL
Status: DISCONTINUED | OUTPATIENT
Start: 2018-08-06 | End: 2018-08-06 | Stop reason: HOSPADM

## 2018-08-06 RX ORDER — FLUMAZENIL 0.1 MG/ML
0.2 INJECTION, SOLUTION INTRAVENOUS
Status: DISCONTINUED | OUTPATIENT
Start: 2018-08-06 | End: 2018-08-06 | Stop reason: HOSPADM

## 2018-08-06 RX ORDER — LIDOCAINE HYDROCHLORIDE 10 MG/ML
INJECTION, SOLUTION INFILTRATION; PERINEURAL
Status: COMPLETED
Start: 2018-08-06 | End: 2018-08-06

## 2018-08-06 RX ORDER — DEXTROSE MONOHYDRATE 25 G/50ML
25-50 INJECTION, SOLUTION INTRAVENOUS
Status: DISCONTINUED | OUTPATIENT
Start: 2018-08-06 | End: 2018-08-06 | Stop reason: HOSPADM

## 2018-08-06 RX ORDER — LIDOCAINE HYDROCHLORIDE 10 MG/ML
1-30 INJECTION, SOLUTION EPIDURAL; INFILTRATION; INTRACAUDAL; PERINEURAL
Status: COMPLETED | OUTPATIENT
Start: 2018-08-06 | End: 2018-08-06

## 2018-08-06 RX ORDER — CEFAZOLIN SODIUM 2 G/100ML
2 INJECTION, SOLUTION INTRAVENOUS
Status: COMPLETED | OUTPATIENT
Start: 2018-08-06 | End: 2018-08-06

## 2018-08-06 RX ORDER — FENTANYL CITRATE 50 UG/ML
INJECTION, SOLUTION INTRAMUSCULAR; INTRAVENOUS
Status: COMPLETED
Start: 2018-08-06 | End: 2018-08-06

## 2018-08-06 RX ADMIN — LIDOCAINE HYDROCHLORIDE 4 ML: 10 INJECTION, SOLUTION EPIDURAL; INFILTRATION; INTRACAUDAL; PERINEURAL at 11:34

## 2018-08-06 RX ADMIN — FENTANYL CITRATE 50 MCG: 50 INJECTION, SOLUTION INTRAMUSCULAR; INTRAVENOUS at 11:32

## 2018-08-06 RX ADMIN — FENTANYL CITRATE 50 MCG: 50 INJECTION INTRAMUSCULAR; INTRAVENOUS at 11:32

## 2018-08-06 RX ADMIN — LIDOCAINE HYDROCHLORIDE 10 ML: 10; .005 INJECTION, SOLUTION EPIDURAL; INFILTRATION; INTRACAUDAL; PERINEURAL at 11:36

## 2018-08-06 RX ADMIN — MIDAZOLAM HYDROCHLORIDE 1 MG: 1 INJECTION, SOLUTION INTRAMUSCULAR; INTRAVENOUS at 11:32

## 2018-08-06 RX ADMIN — CEFAZOLIN SODIUM 2 G: 2 INJECTION, SOLUTION INTRAVENOUS at 11:09

## 2018-08-06 RX ADMIN — LIDOCAINE HYDROCHLORIDE 4 ML: 10 INJECTION, SOLUTION INFILTRATION; PERINEURAL at 11:34

## 2018-08-06 NOTE — PROGRESS NOTES
Denies c/o post port removal-- see flow sheets.  Wife is here.  Does not want anything to eat or drink when offered

## 2018-08-06 NOTE — IP AVS SNAPSHOT
[place] [address]    [place] [address]    Phone:  [phone]                                       After Visit Summary   8/6/2018    Sallie Ceballos    MRN: [id number]           After Visit Summary Signature Page     I have received my discharge instructions, and my questions have been answered. I have discussed any challenges I see with this plan with the nurse or doctor.    ..........................................................................................................................................  Patient/Patient Representative Signature      ..........................................................................................................................................  Patient Representative Print Name and Relationship to Patient    ..................................................               ................................................  Date                                            Time    ..........................................................................................................................................  Reviewed by Signature/Title    ...................................................              ..............................................  Date                                                            Time

## 2018-08-06 NOTE — PROGRESS NOTES
Interventional Radiology Pre-Procedure Sedation Assessment   Time of Assessment: 9:45 AM    Expected Level: Moderate Sedation    Indication: Sedation is required for the following type of Procedure: Left port a catheter removal     Sedation and procedural consent: Risks, benefits and alternatives were discussed with Patient and Spouse, Dr Zenia CASTRO Intake: Appropriately NPO for procedure    ASA Class: Class 2 - MILD SYSTEMIC DISEASE, NO ACUTE PROBLEMS, NO FUNCTIONAL LIMITATIONS.    Mallampati: Grade 2:  Soft palate, base of uvula, tonsillar pillars, and portion of posterior pharyngeal wall visible    Lungs: Lungs Clear with good breath sounds bilaterally    Heart: Normal heart sounds with bradycardia    History and physical reviewed and no updates needed. I have reviewed the lab findings, diagnostic data, medications, and the plan for sedation. I have determined this patient to be an appropriate candidate for the planned sedation and procedure and have reassessed the patient IMMEDIATELY PRIOR to sedation and procedure.    Danuta Ghosh, DAVID CNP

## 2018-08-06 NOTE — PROGRESS NOTES
Care Suites Discharge Summary    Discharge Criteria:   Discharge Criteria met per MD orders: Yes.   Vital signs stable.     Pt demonstrates ability to ambulate safely: Yes.  (See discharge questionnaire for additional information)    Discharge instructions & education:   Discharge instructions reviewed with patient and wife. Patient verbalizes  understanding.       Medications:   Patient will be discharging on new medications- No. Patient verbalizes reason for use, start date, and side effects NA.    Items returned to patient:   Home and hospital acquired medications returned to patient NA   Listed belongings gathered and returned to patient: Yes    Patient will be discharged to home via w/c with wife at 1350    Leonila Pozo

## 2018-08-06 NOTE — PROCEDURES
RADIOLOGY POST PROCEDURE NOTE w/ SEDATION  Patient name: Sallie Ceballos  MRN: [id number]  : 1945    Pre-procedure diagnosis: Colon Cancer  Post-procedure diagnosis: Same    Procedure Date/Time: 2018  11:58 AM  Procedure: Chest port removal  Estimated blood loss: None  Specimen(s) collected with description: none    I determined this patient to be an appropriate candidate for the planned sedation and procedure and reassessed the patient IMMEDIATELY PRIOR to sedation and procedure.     The patient tolerated the procedure well with no immediate complications.  Significant findings:none    See imaging dictation for procedural details.    Provider name: Mario Knutson  Assistant(s):None

## 2018-08-06 NOTE — DISCHARGE INSTRUCTIONS
Port Removal Discharge Instructions     After you go home:      Have an adult stay with you for the first 6 hours    You may resume your normal diet       For 24 hours - due to the sedation you received:    Relax and take it easy    Do NOT make any important or legal decisions    Do NOT drive or operate machines at home or at work    Do NOT drink alcohol    Care of Puncture Site:      Keep the dressings on your site clean & dry for 3 days. Change it only if it gets wet or dirty.    You may shower after the dressing comes off in 3 days    Do not remove the small white strips of tape, if present. Allow them to fall off on their own.     You may cover the wound with a bandaid after the dressing is removed if needed for comfort      Activity       Avoid heavy lifting (greater than 10 pounds) or the overuse of your shoulder for 3 days    Bleeding:      If you start bleeding from the incision site in your chest or have swelling in your neck, sit down and press on the site for 5-10 minutes.     If bleeding has not stopped after 10 minutes, call your provider.        Call 911 right away if you have heavy bleeding or bleeding that does not stop.      Medicines:      You may resume all medications    Resume your Warfarin/Coumadin at your regular dose today. Follow up with your provider to have your INR rechecked    Resume your Platelet Inhibitors and Aspirin tomorrow at your regular dose    For minor pain, you may take Acetaminophen (Tylenol) or Ibuprofen (Advil)          Call the provider who ordered this procedure if:      You have swelling in your neck or over your port site    The incision area is red, swollen, hot or tender    You have chills or a fever greater than 101 F (38 C)    Any questions or concerns    Call  911 or go to the Emergency Room if you have:      Severe chest pain or trouble breathing    Bleeding that you cannot control    If you have questions call:          [facility] Radiology Dept @  [phone]    The provider who performed your procedure was Dr Rodriguez.

## 2018-08-06 NOTE — IP AVS SNAPSHOT
MRN:[id number]                      After Visit Summary   8/6/2018    Sallie Ceballos    MRN: [id number]           Visit Information        Department      8/6/2018  7:53 AM [facility]          Review of your medicines      UNREVIEWED medicines. Ask your doctor about these medicines        Dose / Directions    amLODIPine-benazepril 10-20 MG per capsule   Commonly known as:  LOTREL   Used for:  Essential hypertension        Dose:  1 capsule   Take 1 capsule by mouth daily   Quantity:  90 capsule   Refills:  3       COMPAZINE PO        Dose:  10 mg   Take 10 mg by mouth every 6 hours as needed for nausea   Refills:  0       HYDROcodone-acetaminophen 5-325 MG per tablet   Commonly known as:  NORCO   Used for:  Malignant neoplasm of colon, unspecified part of colon (H)        Dose:  1 tablet   Take 1 tablet by mouth every 6 hours as needed for other (Moderate to Severe Pain)   Quantity:  6 tablet   Refills:  0       ZOFRAN ODT PO        Dose:  8 mg   Take 8 mg by mouth every 8 hours as needed for nausea   Refills:  0                Protect others around you: Learn how to safely use, store and throw away your medicines at www.disposemymeds.org.         Follow-ups after your visit        Your next 10 appointments already scheduled     Aug 06, 2018 10:00 AM CDT   (Arrive by 8:30 AM)   IR PORT REMOVAL LEFT with SHIR1   [facility] Interventional Radiology ([facility] [place])    [address] [place] [id number] [phone]           1. Your doctor will need to do a history and physical within 7 days before this procedure. 2. Your doctor decide will which medications should not be taken the morning of the exam. 3. Laboratory tests are to be obtained by your doctor prior to the exam (Basic Metabolic Panel, CBCP, PTT and INR) (No labs needed if you are having a tunneled catheter exchange or removal) 4. If you have allergies to x-ray contrast or iodine, contact your  doctor or a Radiology nurse prior to the exam day for instructions. 5. Someone will need to drive you to and from the hospital. 6. If you are or may be pregnant, contact your doctor or a Radiology nurse prior to the day of the exam. 7. If you have diabetes, check with your doctor or a Radiology nurse to see if your insulin needs to be adjusted for the exam. 8. If you are taking a medication called Glucophage or Glucovance; these medications need to be held the day of the exam and for approximately 48 hours following. A blood sample must be drawn so your creatinine level can be checked before resuming this medication. 9. If you are taking Coumadin (to thin you blood) please contact your doctor or a Radiology nurse at least 3 days before the exam for special instructions. 10. You should not have received contrast within 48 hours of this exam. 11. The day before your exam you may eat your regular diet and are encouraged to drink at least 2 quarts of clear liquids. Drink no alcoholic beverages for 24 hours prior to the exam. 12. If you have a colostomy you will need to irrigate it with tap water at 8PM the evening before and again at 6AM the morning of the exam. 13. Do not smoke for 24 hours prior to the procedure. 14. Birth to 4 years: - Breast feeding must be stopped 4 hours prior to exam - Solid food or formula must be stopped 6 hours prior to exam - Tube feedings must be stopped 6 hours prior to exam 15. 4-10 years old: - Nothing to eat or drink 6 hours prior to exam 16. 10+ years old: - Nothing to eat or drink 8 hours prior to exam 17. The morning of the exam you may brush your teeth and take medications as directed with a sip of water. 18. When discharged, you cannot drive until morning, and an adult must be with you until then. You should stay in the [facility] overnight. 19. Bring a list of all drugs you are taking; include supplements and over-the-counter medications. Wear comfortable clothes and leave  your valuables at home.               Care Instructions        Further instructions from your care team         Port Removal Discharge Instructions     After you go home:      Have an adult stay with you for the first 6 hours    You may resume your normal diet       For 24 hours - due to the sedation you received:    Relax and take it easy    Do NOT make any important or legal decisions    Do NOT drive or operate machines at home or at work    Do NOT drink alcohol    Care of Puncture Site:      Keep the dressings on your site clean & dry for 3 days. Change it only if it gets wet or dirty.    You may shower after the dressing comes off in 3 days    Do not remove the small white strips of tape, if present. Allow them to fall off on their own.     You may cover the wound with a bandaid after the dressing is removed if needed for comfort      Activity       Avoid heavy lifting (greater than 10 pounds) or the overuse of your shoulder for 3 days    Bleeding:      If you start bleeding from the incision site in your chest or have swelling in your neck, sit down and press on the site for 5-10 minutes.     If bleeding has not stopped after 10 minutes, call your provider.        Call 911 right away if you have heavy bleeding or bleeding that does not stop.      Medicines:      You may resume all medications    Resume your Warfarin/Coumadin at your regular dose today. Follow up with your provider to have your INR rechecked    Resume your Platelet Inhibitors and Aspirin tomorrow at your regular dose    For minor pain, you may take Acetaminophen (Tylenol) or Ibuprofen (Advil)          Call the provider who ordered this procedure if:      You have swelling in your neck or over your port site    The incision area is red, swollen, hot or tender    You have chills or a fever greater than 101 F (38 C)    Any questions or concerns    Call  911 or go to the Emergency Room if you have:      Severe chest pain or trouble  "breathing    Bleeding that you cannot control    If you have questions call:          [facility] Radiology Dept @ [phone]    The provider who performed your procedure was Dr Rodriguez.     Additional Information About Your Visit        Care EveryWhere ID     This is your Care EveryWhere ID. This could be used by other organizations to access your [facility] medical records  [id number]        Your Vitals Were     Blood Pressure Pulse Temperature Respirations Height Weight    135/77 (BP Location: Right arm) 68 97.8  F (36.6  C) (Oral) 16 1.575 m (5' 2\") 53.5 kg (118 lb)    Pulse Oximetry BMI (Body Mass Index)                98% 21.58 kg/m2           Primary Care Provider Office Phone # Fax #    Vernell Rocael Shahid [phone]      Equal Access to Services     [facility] : Hadii suma perrin hadasho Sofrankie, waaxda luqadaha, qaybta kaalmada adetyleryada, avinash jimenez . So [facility] [phone].    ATENCIÓN: Si habla español, tiene a eagle disposición servicios gratuitos de asistencia lingüística. [facility] [phone].    We comply with applicable federal civil rights laws and [place] laws. We do not discriminate on the basis of race, color, national origin, age, disability, sex, sexual orientation, or gender identity.            Thank you!     Thank you for choosing [facility] for your care. Our goal is always to provide you with excellent care. Hearing back from our patients is one way we can continue to improve our services. Please take a few minutes to complete the written survey that you may receive in the mail after you visit with us. Thank you!             Medication List: This is a list of all your medications and when to take them. Check marks below indicate your daily home schedule. Keep this list as a reference.      Medications           Morning Afternoon Evening Bedtime As Needed    amLODIPine-benazepril 10-20 MG per capsule   Commonly known as:  LOTREL   Take 1 " capsule by mouth daily                                COMPAZINE PO   Take 10 mg by mouth every 6 hours as needed for nausea                                HYDROcodone-acetaminophen 5-325 MG per tablet   Commonly known as:  NORCO   Take 1 tablet by mouth every 6 hours as needed for other (Moderate to Severe Pain)                                ZOFRAN ODT PO   Take 8 mg by mouth every 8 hours as needed for nausea

## 2018-08-06 NOTE — PROGRESS NOTES
Here with wife for port removal.  Explained pre procedure and answered questions, discharge instructions given with verbalized understanding.  Resting on cart with call light in reach.

## 2019-01-04 ENCOUNTER — TRANSFERRED RECORDS (OUTPATIENT)
Dept: HEALTH INFORMATION MANAGEMENT | Facility: CLINIC | Age: 74
End: 2019-01-04

## 2019-08-20 ENCOUNTER — OFFICE VISIT (OUTPATIENT)
Dept: URGENT CARE | Facility: URGENT CARE | Age: 74
End: 2019-08-20
Payer: COMMERCIAL

## 2019-08-20 ENCOUNTER — HOSPITAL ENCOUNTER (EMERGENCY)
Facility: CLINIC | Age: 74
Discharge: HOME | End: 2019-08-20
Attending: EMERGENCY MEDICINE | Admitting: EMERGENCY MEDICINE
Payer: COMMERCIAL

## 2019-08-20 ENCOUNTER — APPOINTMENT (OUTPATIENT)
Dept: ULTRASOUND IMAGING | Facility: CLINIC | Age: 74
End: 2019-08-20
Attending: EMERGENCY MEDICINE
Payer: COMMERCIAL

## 2019-08-20 VITALS
OXYGEN SATURATION: 96 % | TEMPERATURE: 98.3 F | RESPIRATION RATE: 16 BRPM | SYSTOLIC BLOOD PRESSURE: 124 MMHG | WEIGHT: 126 LBS | BODY MASS INDEX: 23.05 KG/M2 | DIASTOLIC BLOOD PRESSURE: 69 MMHG

## 2019-08-20 VITALS
HEART RATE: 87 BPM | BODY MASS INDEX: 22.86 KG/M2 | TEMPERATURE: 98.9 F | DIASTOLIC BLOOD PRESSURE: 74 MMHG | SYSTOLIC BLOOD PRESSURE: 116 MMHG | OXYGEN SATURATION: 98 % | RESPIRATION RATE: 16 BRPM | WEIGHT: 125 LBS

## 2019-08-20 DIAGNOSIS — R10.11 RUQ ABDOMINAL PAIN: Primary | ICD-10-CM

## 2019-08-20 DIAGNOSIS — B02.9 HERPES ZOSTER WITHOUT COMPLICATION: ICD-10-CM

## 2019-08-20 DIAGNOSIS — R10.11 RUQ ABDOMINAL PAIN: ICD-10-CM

## 2019-08-20 LAB
ALBUMIN SERPL-MCNC: 3.7 G/DL (ref 3.4–5)
ALP SERPL-CCNC: 89 U/L (ref 40–150)
ALT SERPL W P-5'-P-CCNC: 27 U/L (ref 0–70)
ANION GAP SERPL CALCULATED.3IONS-SCNC: 6 MMOL/L (ref 3–14)
AST SERPL W P-5'-P-CCNC: 26 U/L (ref 0–45)
BASOPHILS # BLD AUTO: 0 10E9/L (ref 0–0.2)
BASOPHILS NFR BLD AUTO: 0.6 %
BILIRUB SERPL-MCNC: 0.5 MG/DL (ref 0.2–1.3)
BUN SERPL-MCNC: 22 MG/DL (ref 7–30)
CALCIUM SERPL-MCNC: 8.6 MG/DL (ref 8.5–10.1)
CHLORIDE SERPL-SCNC: 107 MMOL/L (ref 94–109)
CO2 SERPL-SCNC: 25 MMOL/L (ref 20–32)
CREAT SERPL-MCNC: 1.05 MG/DL (ref 0.66–1.25)
DIFFERENTIAL METHOD BLD: NORMAL
EOSINOPHIL # BLD AUTO: 0.1 10E9/L (ref 0–0.7)
EOSINOPHIL NFR BLD AUTO: 0.7 %
ERYTHROCYTE [DISTWIDTH] IN BLOOD BY AUTOMATED COUNT: 13.9 % (ref 10–15)
GFR SERPL CREATININE-BSD FRML MDRD: 71 ML/MIN/{1.73_M2}
GLUCOSE SERPL-MCNC: 104 MG/DL (ref 70–99)
HCT VFR BLD AUTO: 42.8 % (ref 40–53)
HGB BLD-MCNC: 14.5 G/DL (ref 13.3–17.7)
IMM GRANULOCYTES # BLD: 0 10E9/L (ref 0–0.4)
IMM GRANULOCYTES NFR BLD: 0.3 %
LIPASE SERPL-CCNC: 179 U/L (ref 73–393)
LYMPHOCYTES # BLD AUTO: 2.1 10E9/L (ref 0.8–5.3)
LYMPHOCYTES NFR BLD AUTO: 30.5 %
MCH RBC QN AUTO: 30.9 PG (ref 26.5–33)
MCHC RBC AUTO-ENTMCNC: 33.9 G/DL (ref 31.5–36.5)
MCV RBC AUTO: 91 FL (ref 78–100)
MONOCYTES # BLD AUTO: 0.7 10E9/L (ref 0–1.3)
MONOCYTES NFR BLD AUTO: 10.9 %
NEUTROPHILS # BLD AUTO: 3.9 10E9/L (ref 1.6–8.3)
NEUTROPHILS NFR BLD AUTO: 57 %
NRBC # BLD AUTO: 0 10*3/UL
NRBC BLD AUTO-RTO: 0 /100
PLATELET # BLD AUTO: 190 10E9/L (ref 150–450)
POTASSIUM SERPL-SCNC: 4 MMOL/L (ref 3.4–5.3)
PROT SERPL-MCNC: 8.4 G/DL (ref 6.8–8.8)
RBC # BLD AUTO: 4.69 10E12/L (ref 4.4–5.9)
SODIUM SERPL-SCNC: 138 MMOL/L (ref 133–144)
WBC # BLD AUTO: 6.8 10E9/L (ref 4–11)

## 2019-08-20 PROCEDURE — 85025 COMPLETE CBC W/AUTO DIFF WBC: CPT | Performed by: EMERGENCY MEDICINE

## 2019-08-20 PROCEDURE — 99215 OFFICE O/P EST HI 40 MIN: CPT | Performed by: PHYSICIAN ASSISTANT

## 2019-08-20 PROCEDURE — 25000128 H RX IP 250 OP 636: Performed by: EMERGENCY MEDICINE

## 2019-08-20 PROCEDURE — 99285 EMERGENCY DEPT VISIT HI MDM: CPT | Mod: 25

## 2019-08-20 PROCEDURE — 96360 HYDRATION IV INFUSION INIT: CPT

## 2019-08-20 PROCEDURE — 76705 ECHO EXAM OF ABDOMEN: CPT

## 2019-08-20 PROCEDURE — 80053 COMPREHEN METABOLIC PANEL: CPT | Performed by: EMERGENCY MEDICINE

## 2019-08-20 PROCEDURE — 83690 ASSAY OF LIPASE: CPT | Performed by: EMERGENCY MEDICINE

## 2019-08-20 RX ORDER — VALACYCLOVIR HYDROCHLORIDE 1 G/1
1000 TABLET, FILM COATED ORAL 3 TIMES DAILY
Qty: 21 TABLET | Refills: 0 | Status: SHIPPED | OUTPATIENT
Start: 2019-08-20 | End: 2019-08-27

## 2019-08-20 RX ADMIN — SODIUM CHLORIDE 1000 ML: 9 INJECTION, SOLUTION INTRAVENOUS at 18:09

## 2019-08-20 ASSESSMENT — ENCOUNTER SYMPTOMS
CHILLS: 0
FEVER: 0
FEVER: 0
SHORTNESS OF BREATH: 0
ABDOMINAL PAIN: 1
FOCAL WEAKNESS: 0
NAUSEA: 0
ABDOMINAL PAIN: 1
HEADACHES: 0
DYSURIA: 0
SHORTNESS OF BREATH: 0
VOMITING: 0
DIARRHEA: 0

## 2019-08-20 NOTE — ED AVS SNAPSHOT
Emergency Department  [address] [place] [address]  Phone:  [phone]  Fax:  [phone]                                    Sallie Ceballos   MRN: [id number]    Department:   Emergency Department   Date of Visit:  8/20/2019           After Visit Summary Signature Page    I have received my discharge instructions, and my questions have been answered. I have discussed any challenges I see with this plan with the nurse or doctor.    ..........................................................................................................................................  Patient/Patient Representative Signature      ..........................................................................................................................................  Patient Representative Print Name and Relationship to Patient    ..................................................               ................................................  Date                                   Time    ..........................................................................................................................................  Reviewed by Signature/Title    ...................................................              ..............................................  Date                                               Time          22EPIC Rev 08/18

## 2019-08-20 NOTE — PROGRESS NOTES
HPI  August 20, 2019    HPI: Sallie Ceballos is a 73 year old male who complains of moderate RUQ abdominal pain onset 1 week ago. Pain is sharp & intermittent- no known aggravating or alleviating factors. Pt has hx colon cancer s/p surgery, followed by GI. No hx similar pain before. No hx GERD.  No treatments tried. Denies fever/chills, HA, CP, SOB, N/V/D, rash, or any other symptoms.    Past Medical History:   Diagnosis Date     BPPV (benign paroxysmal positional vertigo)      Colon cancer (H)      FH: colon cancer      Heart disease      HLD (hyperlipidemia)      Hypertension      Prediabetes      Past Surgical History:   Procedure Laterality Date     COLONOSCOPY N/A 4/24/2017    Procedure: COMBINED COLONOSCOPY, SINGLE OR MULTIPLE BIOPSY/POLYPECTOMY BY BIOPSY;  colonoscopy;  Surgeon: Estrella Epstein MD;  Location:  GI     COLONOSCOPY N/A 6/1/2017    Procedure: COLONOSCOPY;  Tattoo submucosal injection ;  Surgeon: Darwin Lowry MD;  Location:  OR     INSERT PORT VASCULAR ACCESS N/A 6/29/2017    Procedure: INSERT PORT VASCULAR ACCESS;  POWER PORT PLACEMENT ;  Surgeon: Alex Martinez MD;  Location:  OR     LAPAROSCOPIC ASSISTED SIGMOID COLECTOMY N/A 6/1/2017    Procedure: LAPAROSCOPIC ASSISTED SIGMOID COLECTOMY;  INTRAOP COLONOSCOPY WITH TATTOOING/ LAPAROSCOPIC ASSISTED LEFT COLECTOMY ;  Surgeon: Darwin Lowry MD;  Location:  OR     Social History     Tobacco Use     Smoking status: Never Smoker     Smokeless tobacco: Never Used   Substance Use Topics     Alcohol use: No     Alcohol/week: 0.0 oz     Drug use: No     Patient Active Problem List   Diagnosis     Hypertensive urgency     Dizziness     Colon cancer (H)     No family history on file.     Problem list, Medication list, Allergies, and Medical/Social/Surgical histories reviewed in [facility] and updated as appropriate.      Review of Systems   Constitutional: Negative for chills and fever.   Respiratory: Negative for shortness of breath.     Cardiovascular: Negative for chest pain.   Gastrointestinal: Positive for abdominal pain. Negative for diarrhea, nausea and vomiting.   Skin: Negative for rash.   Neurological: Negative for focal weakness and headaches.   All other systems reviewed and are negative.        Physical Exam   Constitutional: He is oriented to person, place, and time.   HENT:   Head: Normocephalic and atraumatic.   Cardiovascular: Normal rate, regular rhythm and normal heart sounds.   Pulmonary/Chest: Effort normal and breath sounds normal.   Abdominal: Normal appearance. There is tenderness in the right upper quadrant. There is no rigidity and no guarding.   Musculoskeletal: Normal range of motion.   Neurological: He is alert and oriented to person, place, and time.   Skin: Skin is warm and dry.   Nursing note and vitals reviewed.    Vital Signs  /74   Pulse 87   Temp 98.9  F (37.2  C) (Oral)   Resp 16   Wt 56.7 kg (125 lb)   SpO2 98%   BMI 22.86 kg/m       Diagnostic Test Results:  none     ASSESSMENT/PLAN      ICD-10-CM    1. RUQ abdominal pain R10.11       Pt presents with sharp abdominal pain & tenderness on exam- have advised he go to ER at [facility] for further testing. Wife will take him there by private car right now.      I have discussed any lab or imaging results, the patient's diagnosis, and my plan of treatment with the patient and/or family. Patient is aware to come back in if with worsening symptoms or if no relief despite treatment plan.  Patient voiced understanding and had no further questions.       Follow Up: Return in about 1 week (around 8/27/2019) for Follow up w/ PCP after ER visit.    ZULLY Douglas, PADayaC  [facility] URGENT CARE

## 2019-08-20 NOTE — ED PROVIDER NOTES
History     Chief Complaint:  Abdominal pain     FELICIA Ceballos is a 73 year old male with a history of colon cancer who presents with abdominal pain. The patient has had pain for the last week in the right upper quadrant of his abdomen. He has also had a rash present in the right upper quadrant of his abdomen, right side, and back for the past 2 days that has not been painful. Here, he denies any radiating pain, chest pain, shortness of breath, dysuria, fever, recent international travel, or use of any pain medications.    Allergies:  The patient has no known drug allergies.    Medications:    Lotrel   Norco   Zofran   Compazine      Past Medical History:    Colon cancer   Dizziness    BPPV   Heart disease   Hyperlipidemia   Hypertension   Pre diabetes mellitus    Past Surgical History:    Colonoscopy X2   Insert port vascular access   Laparoscopic sigmoid colectomy    Family History:   No past pertinent family history.    Social History:  Marital Status:     Smoker:   Never   Smokeless:   Never   Alcohol:   No   Drugs:   No   Arrives with:   Wife      Review of Systems   Constitutional: Negative for fever.   Respiratory: Negative for shortness of breath.    Cardiovascular: Negative for chest pain.   Gastrointestinal: Positive for abdominal pain.   Genitourinary: Negative for dysuria.   Skin: Positive for rash.   All other systems reviewed and are negative.    Physical Exam     Patient Vitals for the past 24 hrs:   BP Temp Temp src Heart Rate Resp SpO2 Weight   08/20/19 1910 -- -- -- 95 16 96 % --   08/20/19 1746 124/69 98.3  F (36.8  C) Oral 98 16 95 % 57.2 kg (126 lb)     Physical Exam  General: Alert, appears elderly, otherwise well-developed and well-nourished. Cooperative.     In no distress  HEENT:  Head:  Atraumatic  Ears:  External ears are normal  Mouth/Throat:  Oropharynx is without erythema or exudate and mucous membranes are moist.   Eyes:   Conjunctivae normal and EOM are normal. No scleral  icterus.  CV:  Normal rate, regular rhythm, normal heart sounds and radial pulses are 2+ and symmetric.  No murmur.  Resp:  Breath sounds are clear bilaterally    Non-labored, no retractions or accessory muscle use  GI:  Abdomen is soft, no distension, mild RUQ tenderness. No rebound or guarding.  No CVA tenderness bilaterally  MS:  Normal range of motion. No edema.    Normal strength in all 4 extremities.     Back atraumatic.    No midline cervical, thoracic, or lumbar tenderness  Skin:  Warm and dry.  Vesicular erythematous rash to right flank wrapping to anterior abdomen, see pictures, consistent with shingles.   Neuro:   Alert. Normal strength.  GCS: 15  Psych:  Normal mood and affect.                Emergency Department Course   Imaging:  Radiographic findings were communicated with the patient and family who voiced understanding of the findings.    US Abdomen, right upper quadrant:  Negative abdominal ultrasound. as per radiology.     Laboratory:  Lipase: 179  CBC: WBC: 6.8, HGB: 14.5, PLT: 190  CMP: Glucose 104, o/w WNL (Creatinine: 1.05)    Interventions:  1809: NS 1L IV Bolus     Emergency Department Course:  1750 I performed an exam of the patient as documented above.   1907 I rechecked the patient and discussed the results of their workup thus far.     Findings and plan explained. Patient discharged home with instructions regarding supportive care, medications, and reasons to return. The importance of close follow-up was reviewed. I personally reviewed the laboratory results with them and answered all related questions prior to discharge.    Impression & Plan    Medical Decision Making:  Sallie Ceballos is a 73 year old male who presents for evaluation of rash and abdominal pain.  The rash is in a dermatomal distribution over a single dermatome and appears consistent with herpes zoster.  Symptoms are consistent with zoster as well.  The patient is otherwise well-appearing.  He did have mild RUQ abdominal pain in  addition to rash and labs and US were reassuringly no gallbladder pathology.  He has a non-surgical abdominal exam and has no nausea, vomiting or diarrhea here in the ED.  I do suspect his abdominal pain is likely secondary to the shingles infection.  There is no immunosuppression or systemic disease concerning for disseminated zoster.  There is no Milligan's sign of infection concerning for ophthalmogic zoster.  There are no signs of superinfection.  Given the onset of symptoms, a prescription for valacyclovir was provided as it has been shown to decrease post-herpetic neuralgia.  Pain medications with tylenol and ibuprofen were also discussed.  He was not having any significant pain with the rash.  The patient is to follow-up with PMD in the next 3-5 days and was given precautions to return for fever, diffuse spread of rash, signs of cellulitis or any other worsening.    Diagnosis:    ICD-10-CM    1. Herpes zoster without complication B02.9 Comprehensive metabolic panel     Lipase   2. RUQ abdominal pain R10.11      Disposition:  discharged to home with Valtrex.     Discharge Medications:  Discharge Medication List as of 8/20/2019  7:06 PM      START taking these medications    Details   valACYclovir (VALTREX) 1000 mg tablet Take 1 tablet (1,000 mg) by mouth 3 times daily for 7 days, Disp-21 tablet, R-0, Local Print           Scribe Disclosure:  I, Jordan Rea, am serving as a scribe on 8/20/2019 at 5:50 PM to personally document services performed by Mark Vicente MD based on my observations and the provider's statements to me.     Jordan Rea  8/20/2019    EMERGENCY DEPARTMENT       Mark Vicente MD  08/21/19 [id number]

## (undated) DEVICE — LINEN TOWEL PACK X5 5464

## (undated) DEVICE — SOL NACL 0.9% IRRIG 1000ML BOTTLE 07138-09

## (undated) DEVICE — ENDO TROCAR OPTICAL 12MM VERSAPORT PLUS W/FIX CAN ONB12STF

## (undated) DEVICE — NDL 19GA 1.5"

## (undated) DEVICE — SOL NACL 0.9% INJ 250ML BAG 2B1322Q

## (undated) DEVICE — DRAPE LEGGINGS 8421

## (undated) DEVICE — ENDO TROCAR OPTICAL 05MM VERSAPORT PLUS W/FIX CAN ONB5STF

## (undated) DEVICE — ESU CORD MONOPOLAR 10'  E0510

## (undated) DEVICE — DRSG BANDAID 3/4X3"

## (undated) DEVICE — Device

## (undated) DEVICE — SU PDS II 0 CTX 60" Z990G

## (undated) DEVICE — DRAPE MAYO STAND 23X54 8337

## (undated) DEVICE — EVAC SYSTEM CLEAR FLOW SC082500

## (undated) DEVICE — SUCTION CANISTER MEDIVAC LINER 3000ML W/LID 65651-530

## (undated) DEVICE — SU VICRYL 0 TIE 12X18" J906G

## (undated) DEVICE — SU VICRYL 3-0 SH 27" J316H

## (undated) DEVICE — SYSTEM CLEARIFY VISUALIZATION 21-345

## (undated) DEVICE — DECANTER BAG 2002S

## (undated) DEVICE — PREP CHLORAPREP W/ORANGE TINT 10.5ML 260715

## (undated) DEVICE — KIT PATIENT POSITIONING PIGAZZI LATEX FREE 40580

## (undated) DEVICE — SU PROLENE 2-0 SHDA 48" 8533H

## (undated) DEVICE — NDL INSUFFLATION 150MM VERRES 172016

## (undated) DEVICE — SYR 03ML LL W/O NDL 309657

## (undated) DEVICE — NDL INSUFFLATION 120MM VERRES

## (undated) DEVICE — DRAPE SHEET REV FOLD 3/4 9349

## (undated) DEVICE — GLOVE PROTEXIS BLUE W/NEU-THERA 7.5  2D73EB75

## (undated) DEVICE — SU VICRYL 2-0 SH 27" J317H

## (undated) DEVICE — PACK MINOR SBA15MIFSE

## (undated) DEVICE — GOWN IMPERVIOUS ZONED LG

## (undated) DEVICE — SYR 10ML FINGER CONTROL W/O NDL 309695

## (undated) DEVICE — GLOVE PROTEXIS W/NEU-THERA 7.5  2D73TE75

## (undated) DEVICE — ENDO CANNULA 05MM VERSAONE UNIVERSAL UNVCA5STF

## (undated) DEVICE — ESU GROUND PAD UNIVERSAL W/O CORD

## (undated) DEVICE — SU VICRYL 2-0 TIE 12X18" J905T

## (undated) DEVICE — SU VICRYL 3-0 SH CR 8X18" J774

## (undated) DEVICE — DRAPE LAP W/ARMBOARD 29410

## (undated) DEVICE — SU VICRYL 4-0 PS-2 18" UND J496H

## (undated) DEVICE — SU DERMABOND MINI DHVM12

## (undated) DEVICE — ESU HOLDER LAP INST DISP PURPLE LONG 330MM H-PRO-330

## (undated) DEVICE — DRAPE IOBAN INCISE 23X17" 6650EZ

## (undated) DEVICE — PREP CHLORAPREP 26ML TINTED ORANGE  260815

## (undated) DEVICE — GOWN IMPERVIOUS ZONED XLG 9041

## (undated) DEVICE — DRAPE LAP TRANSVERSE 29421

## (undated) DEVICE — DECANTER VIAL 2006S

## (undated) DEVICE — SU MONOCRYL 4-0 PS-2 18" UND Y496G

## (undated) DEVICE — PACK MAJOR SBA15MAFSI

## (undated) DEVICE — SUCTION IRR STRYKERFLOW II W/TIP 250-070-520

## (undated) DEVICE — SYR 10ML SLIP TIP W/O NDL

## (undated) DEVICE — DRSG STERI STRIP 1/2X4" R1547

## (undated) DEVICE — STPL ENDO RELOAD GIA 60X3.5MM ROTIC 030458

## (undated) RX ORDER — FENTANYL CITRATE 50 UG/ML
INJECTION, SOLUTION INTRAMUSCULAR; INTRAVENOUS
Status: DISPENSED
Start: 2017-06-27

## (undated) RX ORDER — LIDOCAINE HYDROCHLORIDE 10 MG/ML
INJECTION, SOLUTION INFILTRATION; PERINEURAL
Status: DISPENSED
Start: 2017-06-29

## (undated) RX ORDER — ALVIMOPAN 12 MG/1
CAPSULE ORAL
Status: DISPENSED
Start: 2017-06-01

## (undated) RX ORDER — FENTANYL CITRATE 50 UG/ML
INJECTION, SOLUTION INTRAMUSCULAR; INTRAVENOUS
Status: DISPENSED
Start: 2017-06-01

## (undated) RX ORDER — PROPOFOL 10 MG/ML
INJECTION, EMULSION INTRAVENOUS
Status: DISPENSED
Start: 2017-06-01

## (undated) RX ORDER — FENTANYL CITRATE 50 UG/ML
INJECTION, SOLUTION INTRAMUSCULAR; INTRAVENOUS
Status: DISPENSED
Start: 2018-07-09

## (undated) RX ORDER — ONDANSETRON 2 MG/ML
INJECTION INTRAMUSCULAR; INTRAVENOUS
Status: DISPENSED
Start: 2017-06-01

## (undated) RX ORDER — DEXAMETHASONE SODIUM PHOSPHATE 4 MG/ML
INJECTION, SOLUTION INTRA-ARTICULAR; INTRALESIONAL; INTRAMUSCULAR; INTRAVENOUS; SOFT TISSUE
Status: DISPENSED
Start: 2017-06-01

## (undated) RX ORDER — ACETAMINOPHEN 325 MG/1
TABLET ORAL
Status: DISPENSED
Start: 2017-06-01

## (undated) RX ORDER — CEFAZOLIN SODIUM 2 G/100ML
INJECTION, SOLUTION INTRAVENOUS
Status: DISPENSED
Start: 2018-08-06

## (undated) RX ORDER — PROPOFOL 10 MG/ML
INJECTION, EMULSION INTRAVENOUS
Status: DISPENSED
Start: 2017-06-29

## (undated) RX ORDER — LIDOCAINE HYDROCHLORIDE 20 MG/ML
INJECTION, SOLUTION EPIDURAL; INFILTRATION; INTRACAUDAL; PERINEURAL
Status: DISPENSED
Start: 2017-06-01

## (undated) RX ORDER — HEPARIN SODIUM 5000 [USP'U]/.5ML
INJECTION, SOLUTION INTRAVENOUS; SUBCUTANEOUS
Status: DISPENSED
Start: 2017-06-01

## (undated) RX ORDER — FENTANYL CITRATE 50 UG/ML
INJECTION, SOLUTION INTRAMUSCULAR; INTRAVENOUS
Status: DISPENSED
Start: 2017-04-24

## (undated) RX ORDER — DEXAMETHASONE SODIUM PHOSPHATE 4 MG/ML
INJECTION, SOLUTION INTRA-ARTICULAR; INTRALESIONAL; INTRAMUSCULAR; INTRAVENOUS; SOFT TISSUE
Status: DISPENSED
Start: 2017-06-29

## (undated) RX ORDER — CEFAZOLIN SODIUM 2 G/100ML
INJECTION, SOLUTION INTRAVENOUS
Status: DISPENSED
Start: 2017-06-29

## (undated) RX ORDER — CIPROFLOXACIN 2 MG/ML
INJECTION, SOLUTION INTRAVENOUS
Status: DISPENSED
Start: 2017-06-01

## (undated) RX ORDER — FENTANYL CITRATE 50 UG/ML
INJECTION, SOLUTION INTRAMUSCULAR; INTRAVENOUS
Status: DISPENSED
Start: 2017-06-29

## (undated) RX ORDER — ONDANSETRON 2 MG/ML
INJECTION INTRAMUSCULAR; INTRAVENOUS
Status: DISPENSED
Start: 2017-06-29

## (undated) RX ORDER — HEPARIN SODIUM 1000 [USP'U]/ML
INJECTION, SOLUTION INTRAVENOUS; SUBCUTANEOUS
Status: DISPENSED
Start: 2017-06-29

## (undated) RX ORDER — LIDOCAINE HYDROCHLORIDE 20 MG/ML
INJECTION, SOLUTION EPIDURAL; INFILTRATION; INTRACAUDAL; PERINEURAL
Status: DISPENSED
Start: 2017-06-29

## (undated) RX ORDER — GLYCOPYRROLATE 0.2 MG/ML
INJECTION, SOLUTION INTRAMUSCULAR; INTRAVENOUS
Status: DISPENSED
Start: 2017-06-01

## (undated) RX ORDER — BUPIVACAINE HYDROCHLORIDE 5 MG/ML
INJECTION, SOLUTION EPIDURAL; INTRACAUDAL
Status: DISPENSED
Start: 2017-06-01